# Patient Record
Sex: FEMALE | Race: OTHER | NOT HISPANIC OR LATINO
[De-identification: names, ages, dates, MRNs, and addresses within clinical notes are randomized per-mention and may not be internally consistent; named-entity substitution may affect disease eponyms.]

---

## 2018-01-09 PROBLEM — Z00.00 ENCOUNTER FOR PREVENTIVE HEALTH EXAMINATION: Status: ACTIVE | Noted: 2018-01-09

## 2018-01-31 ENCOUNTER — APPOINTMENT (OUTPATIENT)
Dept: PULMONOLOGY | Facility: CLINIC | Age: 83
End: 2018-01-31
Payer: MEDICARE

## 2018-01-31 VITALS
SYSTOLIC BLOOD PRESSURE: 142 MMHG | RESPIRATION RATE: 14 BRPM | HEIGHT: 58.5 IN | WEIGHT: 162 LBS | BODY MASS INDEX: 33.1 KG/M2 | DIASTOLIC BLOOD PRESSURE: 80 MMHG | OXYGEN SATURATION: 95 % | HEART RATE: 62 BPM

## 2018-01-31 DIAGNOSIS — Z86.79 PERSONAL HISTORY OF OTHER DISEASES OF THE CIRCULATORY SYSTEM: ICD-10-CM

## 2018-01-31 DIAGNOSIS — F41.9 ANXIETY DISORDER, UNSPECIFIED: ICD-10-CM

## 2018-01-31 DIAGNOSIS — R06.02 SHORTNESS OF BREATH: ICD-10-CM

## 2018-01-31 DIAGNOSIS — I27.20 PULMONARY HYPERTENSION, UNSPECIFIED: ICD-10-CM

## 2018-01-31 DIAGNOSIS — R93.8 ABNORMAL FINDINGS ON DIAGNOSTIC IMAGING OF OTHER SPECIFIED BODY STRUCTURES: ICD-10-CM

## 2018-01-31 PROCEDURE — 94727 GAS DIL/WSHOT DETER LNG VOL: CPT

## 2018-01-31 PROCEDURE — 94010 BREATHING CAPACITY TEST: CPT

## 2018-01-31 PROCEDURE — 99205 OFFICE O/P NEW HI 60 MIN: CPT | Mod: 25

## 2018-01-31 PROCEDURE — 94729 DIFFUSING CAPACITY: CPT

## 2018-01-31 PROCEDURE — 85018 HEMOGLOBIN: CPT | Mod: QW

## 2018-01-31 RX ORDER — FLUTICASONE PROPIONATE AND SALMETEROL 50; 100 UG/1; UG/1
100-50 POWDER RESPIRATORY (INHALATION)
Refills: 0 | Status: ACTIVE | COMMUNITY

## 2018-01-31 RX ORDER — LOVASTATIN 40 MG/1
40 TABLET ORAL
Refills: 0 | Status: ACTIVE | COMMUNITY

## 2018-01-31 RX ORDER — DIAZEPAM 2 MG/1
2 TABLET ORAL
Refills: 0 | Status: ACTIVE | COMMUNITY

## 2018-01-31 RX ORDER — ENALAPRIL MALEATE 5 MG/1
5 TABLET ORAL
Refills: 0 | Status: ACTIVE | COMMUNITY

## 2018-01-31 RX ORDER — ESCITALOPRAM OXALATE 20 MG/1
20 TABLET, FILM COATED ORAL
Refills: 0 | Status: ACTIVE | COMMUNITY

## 2018-01-31 RX ORDER — WARFARIN 2.5 MG/1
2.5 TABLET ORAL
Refills: 0 | Status: ACTIVE | COMMUNITY

## 2018-01-31 RX ORDER — METOPROLOL TARTRATE 50 MG/1
50 TABLET, FILM COATED ORAL
Refills: 0 | Status: ACTIVE | COMMUNITY

## 2018-01-31 RX ORDER — MONTELUKAST 10 MG/1
10 TABLET, FILM COATED ORAL
Refills: 0 | Status: ACTIVE | COMMUNITY

## 2018-01-31 RX ORDER — FUROSEMIDE 40 MG/1
40 TABLET ORAL
Refills: 0 | Status: ACTIVE | COMMUNITY

## 2018-01-31 RX ORDER — DIGOXIN 250 UG/1
250 TABLET ORAL
Refills: 0 | Status: ACTIVE | COMMUNITY

## 2022-04-22 ENCOUNTER — OUTPATIENT (OUTPATIENT)
Dept: OUTPATIENT SERVICES | Facility: HOSPITAL | Age: 87
LOS: 1 days | End: 2022-04-22

## 2022-04-22 ENCOUNTER — APPOINTMENT (OUTPATIENT)
Dept: NUCLEAR MEDICINE | Facility: CLINIC | Age: 87
End: 2022-04-22
Payer: MEDICARE

## 2022-04-22 DIAGNOSIS — R25.1 TREMOR, UNSPECIFIED: ICD-10-CM

## 2022-04-22 PROCEDURE — 78803 RP LOCLZJ TUM SPECT 1 AREA: CPT | Mod: 26

## 2022-12-21 ENCOUNTER — INPATIENT (INPATIENT)
Facility: HOSPITAL | Age: 87
LOS: 1 days | Discharge: HOME | DRG: 689 | End: 2022-12-23
Attending: INTERNAL MEDICINE | Admitting: STUDENT IN AN ORGANIZED HEALTH CARE EDUCATION/TRAINING PROGRAM
Payer: MEDICARE

## 2022-12-21 VITALS
RESPIRATION RATE: 16 BRPM | HEIGHT: 62 IN | TEMPERATURE: 98 F | DIASTOLIC BLOOD PRESSURE: 60 MMHG | WEIGHT: 145.06 LBS | HEART RATE: 89 BPM | SYSTOLIC BLOOD PRESSURE: 135 MMHG | OXYGEN SATURATION: 100 %

## 2022-12-21 DIAGNOSIS — E11.9 TYPE 2 DIABETES MELLITUS WITHOUT COMPLICATIONS: ICD-10-CM

## 2022-12-21 DIAGNOSIS — G20 PARKINSON'S DISEASE: ICD-10-CM

## 2022-12-21 DIAGNOSIS — N39.0 URINARY TRACT INFECTION, SITE NOT SPECIFIED: ICD-10-CM

## 2022-12-21 DIAGNOSIS — U07.1 COVID-19: ICD-10-CM

## 2022-12-21 DIAGNOSIS — Z02.9 ENCOUNTER FOR ADMINISTRATIVE EXAMINATIONS, UNSPECIFIED: ICD-10-CM

## 2022-12-21 DIAGNOSIS — G93.41 METABOLIC ENCEPHALOPATHY: ICD-10-CM

## 2022-12-21 DIAGNOSIS — I49.5 SICK SINUS SYNDROME: ICD-10-CM

## 2022-12-21 DIAGNOSIS — Z98.890 OTHER SPECIFIED POSTPROCEDURAL STATES: Chronic | ICD-10-CM

## 2022-12-21 DIAGNOSIS — Z90.49 ACQUIRED ABSENCE OF OTHER SPECIFIED PARTS OF DIGESTIVE TRACT: Chronic | ICD-10-CM

## 2022-12-21 DIAGNOSIS — I25.10 ATHEROSCLEROTIC HEART DISEASE OF NATIVE CORONARY ARTERY WITHOUT ANGINA PECTORIS: ICD-10-CM

## 2022-12-21 DIAGNOSIS — I50.30 UNSPECIFIED DIASTOLIC (CONGESTIVE) HEART FAILURE: ICD-10-CM

## 2022-12-21 DIAGNOSIS — I48.20 CHRONIC ATRIAL FIBRILLATION, UNSPECIFIED: ICD-10-CM

## 2022-12-21 LAB
ALBUMIN SERPL ELPH-MCNC: 3.8 G/DL — SIGNIFICANT CHANGE UP (ref 3.3–5.2)
ALP SERPL-CCNC: 110 U/L — SIGNIFICANT CHANGE UP (ref 40–120)
ALT FLD-CCNC: <5 U/L — SIGNIFICANT CHANGE UP
ANION GAP SERPL CALC-SCNC: 10 MMOL/L — SIGNIFICANT CHANGE UP (ref 5–17)
APPEARANCE UR: ABNORMAL
APTT BLD: 48.4 SEC — HIGH (ref 27.5–35.5)
AST SERPL-CCNC: 24 U/L — SIGNIFICANT CHANGE UP
BACTERIA # UR AUTO: ABNORMAL
BASOPHILS # BLD AUTO: 0.04 K/UL — SIGNIFICANT CHANGE UP (ref 0–0.2)
BASOPHILS NFR BLD AUTO: 0.5 % — SIGNIFICANT CHANGE UP (ref 0–2)
BILIRUB SERPL-MCNC: 0.7 MG/DL — SIGNIFICANT CHANGE UP (ref 0.4–2)
BILIRUB UR-MCNC: NEGATIVE — SIGNIFICANT CHANGE UP
BLD GP AB SCN SERPL QL: SIGNIFICANT CHANGE UP
BUN SERPL-MCNC: 16.3 MG/DL — SIGNIFICANT CHANGE UP (ref 8–20)
CALCIUM SERPL-MCNC: 9.3 MG/DL — SIGNIFICANT CHANGE UP (ref 8.4–10.5)
CHLORIDE SERPL-SCNC: 101 MMOL/L — SIGNIFICANT CHANGE UP (ref 96–108)
CO2 SERPL-SCNC: 29 MMOL/L — SIGNIFICANT CHANGE UP (ref 22–29)
COLOR SPEC: YELLOW — SIGNIFICANT CHANGE UP
COMMENT - URINE: SIGNIFICANT CHANGE UP
CREAT SERPL-MCNC: 0.93 MG/DL — SIGNIFICANT CHANGE UP (ref 0.5–1.3)
DIFF PNL FLD: ABNORMAL
DIGOXIN SERPL-MCNC: 0.8 NG/ML — SIGNIFICANT CHANGE UP (ref 0.8–2)
EGFR: 58 ML/MIN/1.73M2 — LOW
EOSINOPHIL # BLD AUTO: 0.12 K/UL — SIGNIFICANT CHANGE UP (ref 0–0.5)
EOSINOPHIL NFR BLD AUTO: 1.5 % — SIGNIFICANT CHANGE UP (ref 0–6)
EPI CELLS # UR: SIGNIFICANT CHANGE UP
FLUAV AG NPH QL: SIGNIFICANT CHANGE UP
FLUBV AG NPH QL: SIGNIFICANT CHANGE UP
GLUCOSE BLDC GLUCOMTR-MCNC: 89 MG/DL — SIGNIFICANT CHANGE UP (ref 70–99)
GLUCOSE SERPL-MCNC: 124 MG/DL — HIGH (ref 70–99)
GLUCOSE UR QL: NEGATIVE MG/DL — SIGNIFICANT CHANGE UP
GRAN CASTS # UR COMP ASSIST: ABNORMAL /LPF
HCT VFR BLD CALC: 35.5 % — SIGNIFICANT CHANGE UP (ref 34.5–45)
HGB BLD-MCNC: 11.5 G/DL — SIGNIFICANT CHANGE UP (ref 11.5–15.5)
HIV 1 & 2 AB SERPL IA.RAPID: SIGNIFICANT CHANGE UP
HYALINE CASTS # UR AUTO: ABNORMAL /LPF
IMM GRANULOCYTES NFR BLD AUTO: 0.2 % — SIGNIFICANT CHANGE UP (ref 0–0.9)
INR BLD: 3.24 RATIO — HIGH (ref 0.88–1.16)
KETONES UR-MCNC: NEGATIVE — SIGNIFICANT CHANGE UP
LEUKOCYTE ESTERASE UR-ACNC: ABNORMAL
LYMPHOCYTES # BLD AUTO: 1.63 K/UL — SIGNIFICANT CHANGE UP (ref 1–3.3)
LYMPHOCYTES # BLD AUTO: 20.1 % — SIGNIFICANT CHANGE UP (ref 13–44)
MAGNESIUM SERPL-MCNC: 1.9 MG/DL — SIGNIFICANT CHANGE UP (ref 1.8–2.6)
MCHC RBC-ENTMCNC: 29.5 PG — SIGNIFICANT CHANGE UP (ref 27–34)
MCHC RBC-ENTMCNC: 32.4 GM/DL — SIGNIFICANT CHANGE UP (ref 32–36)
MCV RBC AUTO: 91 FL — SIGNIFICANT CHANGE UP (ref 80–100)
MONOCYTES # BLD AUTO: 0.47 K/UL — SIGNIFICANT CHANGE UP (ref 0–0.9)
MONOCYTES NFR BLD AUTO: 5.8 % — SIGNIFICANT CHANGE UP (ref 2–14)
NEUTROPHILS # BLD AUTO: 5.82 K/UL — SIGNIFICANT CHANGE UP (ref 1.8–7.4)
NEUTROPHILS NFR BLD AUTO: 71.9 % — SIGNIFICANT CHANGE UP (ref 43–77)
NITRITE UR-MCNC: NEGATIVE — SIGNIFICANT CHANGE UP
PH UR: 8 — SIGNIFICANT CHANGE UP (ref 5–8)
PLATELET # BLD AUTO: 339 K/UL — SIGNIFICANT CHANGE UP (ref 150–400)
POTASSIUM SERPL-MCNC: 4 MMOL/L — SIGNIFICANT CHANGE UP (ref 3.5–5.3)
POTASSIUM SERPL-SCNC: 4 MMOL/L — SIGNIFICANT CHANGE UP (ref 3.5–5.3)
PROT SERPL-MCNC: 6.9 G/DL — SIGNIFICANT CHANGE UP (ref 6.6–8.7)
PROT UR-MCNC: 15
PROTHROM AB SERPL-ACNC: 38 SEC — HIGH (ref 10.5–13.4)
RBC # BLD: 3.9 M/UL — SIGNIFICANT CHANGE UP (ref 3.8–5.2)
RBC # FLD: 14.8 % — HIGH (ref 10.3–14.5)
RBC CASTS # UR COMP ASSIST: ABNORMAL /HPF (ref 0–4)
RSV RNA NPH QL NAA+NON-PROBE: SIGNIFICANT CHANGE UP
SARS-COV-2 RNA SPEC QL NAA+PROBE: DETECTED
SODIUM SERPL-SCNC: 139 MMOL/L — SIGNIFICANT CHANGE UP (ref 135–145)
SP GR SPEC: 1.01 — SIGNIFICANT CHANGE UP (ref 1.01–1.02)
TROPONIN T SERPL-MCNC: <0.01 NG/ML — SIGNIFICANT CHANGE UP (ref 0–0.06)
TSH SERPL-MCNC: 2.92 UIU/ML — SIGNIFICANT CHANGE UP (ref 0.27–4.2)
UROBILINOGEN FLD QL: NEGATIVE MG/DL — SIGNIFICANT CHANGE UP
WBC # BLD: 8.1 K/UL — SIGNIFICANT CHANGE UP (ref 3.8–10.5)
WBC # FLD AUTO: 8.1 K/UL — SIGNIFICANT CHANGE UP (ref 3.8–10.5)
WBC UR QL: ABNORMAL /HPF (ref 0–5)

## 2022-12-21 PROCEDURE — 99285 EMERGENCY DEPT VISIT HI MDM: CPT

## 2022-12-21 PROCEDURE — 99223 1ST HOSP IP/OBS HIGH 75: CPT

## 2022-12-21 PROCEDURE — 93010 ELECTROCARDIOGRAM REPORT: CPT

## 2022-12-21 PROCEDURE — 71045 X-RAY EXAM CHEST 1 VIEW: CPT | Mod: 26

## 2022-12-21 PROCEDURE — 70450 CT HEAD/BRAIN W/O DYE: CPT | Mod: 26,MA

## 2022-12-21 RX ORDER — SODIUM CHLORIDE 9 MG/ML
1000 INJECTION, SOLUTION INTRAVENOUS
Refills: 0 | Status: DISCONTINUED | OUTPATIENT
Start: 2022-12-21 | End: 2022-12-23

## 2022-12-21 RX ORDER — METOPROLOL TARTRATE 50 MG
25 TABLET ORAL DAILY
Refills: 0 | Status: DISCONTINUED | OUTPATIENT
Start: 2022-12-21 | End: 2022-12-23

## 2022-12-21 RX ORDER — CEFTRIAXONE 500 MG/1
1000 INJECTION, POWDER, FOR SOLUTION INTRAMUSCULAR; INTRAVENOUS ONCE
Refills: 0 | Status: DISCONTINUED | OUTPATIENT
Start: 2022-12-21 | End: 2022-12-21

## 2022-12-21 RX ORDER — DIGOXIN 250 MCG
125 TABLET ORAL DAILY
Refills: 0 | Status: DISCONTINUED | OUTPATIENT
Start: 2022-12-21 | End: 2022-12-23

## 2022-12-21 RX ORDER — METFORMIN HYDROCHLORIDE 850 MG/1
1 TABLET ORAL
Qty: 0 | Refills: 0 | DISCHARGE

## 2022-12-21 RX ORDER — REMDESIVIR 5 MG/ML
200 INJECTION INTRAVENOUS EVERY 24 HOURS
Refills: 0 | Status: COMPLETED | OUTPATIENT
Start: 2022-12-21 | End: 2022-12-21

## 2022-12-21 RX ORDER — DEXTROSE 50 % IN WATER 50 %
25 SYRINGE (ML) INTRAVENOUS ONCE
Refills: 0 | Status: DISCONTINUED | OUTPATIENT
Start: 2022-12-21 | End: 2022-12-23

## 2022-12-21 RX ORDER — INSULIN LISPRO 100/ML
VIAL (ML) SUBCUTANEOUS
Refills: 0 | Status: DISCONTINUED | OUTPATIENT
Start: 2022-12-21 | End: 2022-12-23

## 2022-12-21 RX ORDER — CLOPIDOGREL BISULFATE 75 MG/1
75 TABLET, FILM COATED ORAL DAILY
Refills: 0 | Status: DISCONTINUED | OUTPATIENT
Start: 2022-12-21 | End: 2022-12-23

## 2022-12-21 RX ORDER — BUPROPION HYDROCHLORIDE 150 MG/1
150 TABLET, EXTENDED RELEASE ORAL DAILY
Refills: 0 | Status: DISCONTINUED | OUTPATIENT
Start: 2022-12-21 | End: 2022-12-23

## 2022-12-21 RX ORDER — REMDESIVIR 5 MG/ML
100 INJECTION INTRAVENOUS EVERY 24 HOURS
Refills: 0 | Status: DISCONTINUED | OUTPATIENT
Start: 2022-12-22 | End: 2022-12-23

## 2022-12-21 RX ORDER — FUROSEMIDE 40 MG
1 TABLET ORAL
Qty: 0 | Refills: 0 | DISCHARGE

## 2022-12-21 RX ORDER — CLOPIDOGREL BISULFATE 75 MG/1
1 TABLET, FILM COATED ORAL
Qty: 0 | Refills: 0 | DISCHARGE

## 2022-12-21 RX ORDER — ATORVASTATIN CALCIUM 80 MG/1
1 TABLET, FILM COATED ORAL
Qty: 0 | Refills: 0 | DISCHARGE

## 2022-12-21 RX ORDER — CARBIDOPA AND LEVODOPA 25; 100 MG/1; MG/1
1 TABLET ORAL
Qty: 0 | Refills: 0 | DISCHARGE

## 2022-12-21 RX ORDER — CEFTRIAXONE 500 MG/1
1000 INJECTION, POWDER, FOR SOLUTION INTRAMUSCULAR; INTRAVENOUS EVERY 24 HOURS
Refills: 0 | Status: DISCONTINUED | OUTPATIENT
Start: 2022-12-21 | End: 2022-12-23

## 2022-12-21 RX ORDER — ESCITALOPRAM OXALATE 10 MG/1
20 TABLET, FILM COATED ORAL DAILY
Refills: 0 | Status: DISCONTINUED | OUTPATIENT
Start: 2022-12-21 | End: 2022-12-23

## 2022-12-21 RX ORDER — DEXTROSE 50 % IN WATER 50 %
15 SYRINGE (ML) INTRAVENOUS ONCE
Refills: 0 | Status: DISCONTINUED | OUTPATIENT
Start: 2022-12-21 | End: 2022-12-23

## 2022-12-21 RX ORDER — DIGOXIN 250 MCG
1 TABLET ORAL
Qty: 0 | Refills: 0 | DISCHARGE

## 2022-12-21 RX ORDER — ESCITALOPRAM OXALATE 10 MG/1
1 TABLET, FILM COATED ORAL
Qty: 0 | Refills: 0 | DISCHARGE

## 2022-12-21 RX ORDER — METOPROLOL TARTRATE 50 MG
1 TABLET ORAL
Qty: 0 | Refills: 0 | DISCHARGE

## 2022-12-21 RX ORDER — DEXTROSE 50 % IN WATER 50 %
12.5 SYRINGE (ML) INTRAVENOUS ONCE
Refills: 0 | Status: DISCONTINUED | OUTPATIENT
Start: 2022-12-21 | End: 2022-12-23

## 2022-12-21 RX ORDER — CARBIDOPA AND LEVODOPA 25; 100 MG/1; MG/1
1 TABLET ORAL THREE TIMES A DAY
Refills: 0 | Status: DISCONTINUED | OUTPATIENT
Start: 2022-12-21 | End: 2022-12-23

## 2022-12-21 RX ORDER — BUPROPION HYDROCHLORIDE 150 MG/1
1 TABLET, EXTENDED RELEASE ORAL
Qty: 0 | Refills: 0 | DISCHARGE

## 2022-12-21 RX ORDER — CEFTRIAXONE 500 MG/1
1000 INJECTION, POWDER, FOR SOLUTION INTRAMUSCULAR; INTRAVENOUS ONCE
Refills: 0 | Status: COMPLETED | OUTPATIENT
Start: 2022-12-21 | End: 2022-12-21

## 2022-12-21 RX ORDER — REMDESIVIR 5 MG/ML
INJECTION INTRAVENOUS
Refills: 0 | Status: DISCONTINUED | OUTPATIENT
Start: 2022-12-21 | End: 2022-12-23

## 2022-12-21 RX ORDER — GLUCAGON INJECTION, SOLUTION 0.5 MG/.1ML
1 INJECTION, SOLUTION SUBCUTANEOUS ONCE
Refills: 0 | Status: DISCONTINUED | OUTPATIENT
Start: 2022-12-21 | End: 2022-12-23

## 2022-12-21 RX ORDER — ATORVASTATIN CALCIUM 80 MG/1
40 TABLET, FILM COATED ORAL AT BEDTIME
Refills: 0 | Status: DISCONTINUED | OUTPATIENT
Start: 2022-12-21 | End: 2022-12-23

## 2022-12-21 RX ADMIN — CEFTRIAXONE 1000 MILLIGRAM(S): 500 INJECTION, POWDER, FOR SOLUTION INTRAMUSCULAR; INTRAVENOUS at 14:19

## 2022-12-21 RX ADMIN — ATORVASTATIN CALCIUM 40 MILLIGRAM(S): 80 TABLET, FILM COATED ORAL at 22:40

## 2022-12-21 RX ADMIN — CARBIDOPA AND LEVODOPA 1 TABLET(S): 25; 100 TABLET ORAL at 22:40

## 2022-12-21 RX ADMIN — REMDESIVIR 200 MILLIGRAM(S): 5 INJECTION INTRAVENOUS at 20:25

## 2022-12-21 NOTE — PATIENT PROFILE ADULT - FALL HARM RISK - HARM RISK INTERVENTIONS
Assistance with ambulation/Assistance OOB with selected safe patient handling equipment/Communicate Risk of Fall with Harm to all staff/Discuss with provider need for PT consult/Monitor for mental status changes/Monitor gait and stability/Move patient closer to nurses' station/Provide patient with walking aids - walker, cane, crutches/Reinforce activity limits and safety measures with patient and family/Reorient to person, place and time as needed/Tailored Fall Risk Interventions/Toileting schedule using arm’s reach rule for commode and bathroom/Use of alarms - bed, chair and/or voice tab/Visual Cue: Yellow wristband and red socks/Bed in lowest position, wheels locked, appropriate side rails in place/Call bell, personal items and telephone in reach/Instruct patient to call for assistance before getting out of bed or chair/Non-slip footwear when patient is out of bed/[place] to call system/Physically safe environment - no spills, clutter or unnecessary equipment/Purposeful Proactive Rounding/Room/bathroom lighting operational, light cord in reach

## 2022-12-21 NOTE — ED ADULT NURSE REASSESSMENT NOTE - NS ED NURSE REASSESS COMMENT FT1
assumed care from Renae WAKEFIELD. pt is a 89 y/o/f a&ox1, w/CHF, HTN, DM, parkinsons and dementia who presented with son c/o 2 days of intermittent dizziness, and weakness when trying to walk. pt lives at home with son and has aid from 9-5 daily. pt denies CP, SOB, fevers, chills, abd pain, N/V/D. pt was dx COVID+ 1 week ago but presently only has a cough. pt is admitted to the hospital, son remains at bedside.

## 2022-12-21 NOTE — ED PROVIDER NOTE - CARE PLAN
1 Principal Discharge DX:	Acute UTI  Secondary Diagnosis:	Delirium   Principal Discharge DX:	Acute UTI  Secondary Diagnosis:	Delirium  Secondary Diagnosis:	Atrial fibrillation

## 2022-12-21 NOTE — ED PROVIDER NOTE - PHYSICAL EXAMINATION
Gen: NAD, AOx1-2 oriented to self and location  Head: NCAT  HEENT: oral mucosa moist, normal conjunctiva, neck supple  Lung: CTAB, no respiratory distress  CV: rrr, no murmur, Normal perfusion  Abd: soft, NTND  MSK: No edema, no visible deformities  Neuro: no facial asymmetry, no dysarthria, 5/5 UE strength and LE strength b/l, unable to assess ataxia due to comprehension  Skin: No rash   Psych: normal affect

## 2022-12-21 NOTE — ED PROVIDER NOTE - CLINICAL SUMMARY MEDICAL DECISION MAKING FREE TEXT BOX
patient in no acute distress, describing intermittent positional dizziness. no neuro deficits on exam. priority CT head on warfarin. labs. ekg. noted in A fib, unsure if new. interrogate pacemaker. reasses

## 2022-12-21 NOTE — ED ADULT TRIAGE NOTE - CHIEF COMPLAINT QUOTE
pt BIBA from home @ baseline mental status, reports dizziness when standing up x 2 days. pt denies chest pain, sob, n/v, falls, fevers. pt hx dementia. dr judge @ bedside, pt taken to ct

## 2022-12-21 NOTE — ED PROVIDER NOTE - PROGRESS NOTE DETAILS
Sons arrived, state patient has Parkinsons but over last 2 days much more confused. Did not recognize son today. had COVID last week- doing better. had spoken with physician who stated could be long covid/post covid. Does have foul smelling urine and has had UTI in past. discused advanced directives- none in place but will discuss with family. -Joshua DO patient noted in A fib, unclear if new onset is on warfarin. will page cardiology for inpatient christine Diaz DO

## 2022-12-21 NOTE — H&P ADULT - NSHPLABSRESULTS_GEN_ALL_CORE
LABS:                         11.5   8.10  )-----------( 339      ( 21 Dec 2022 10:30 )             35.5         139  |  101  |  16.3  ----------------------------<  124<H>  4.0   |  29.0  |  0.93    Ca    9.3      21 Dec 2022 10:30  Mg     1.9         TPro  6.9  /  Alb  3.8  /  TBili  0.7  /  DBili  x   /  AST  24  /  ALT  <5  /  AlkPhos  110  12    PT/INR - ( 21 Dec 2022 10:30 )   PT: 38.0 sec;   INR: 3.24 ratio         PTT - ( 21 Dec 2022 10:30 )  PTT:48.4 sec  Urinalysis Basic - ( 21 Dec 2022 12:00 )    Color: Yellow / Appearance: Slightly Turbid / S.015 / pH: x  Gluc: x / Ketone: Negative  / Bili: Negative / Urobili: Negative mg/dL   Blood: x / Protein: 15 / Nitrite: Negative   Leuk Esterase: Moderate / RBC: 3-5 /HPF / WBC 11-25 /HPF   Sq Epi: x / Non Sq Epi: Few / Bacteria: Moderate      CARDIAC MARKERS ( 21 Dec 2022 10:30 )  x     / <0.01 ng/mL / x     / x     / x          Serum Pro-Brain Natriuretic Peptide: 491 pg/mL ( @ 10:30)      Records reviewed from prior hospitalization.  Labs reviewed remarkable for   EKG personally reviewed   CXR personally reviewed

## 2022-12-21 NOTE — H&P ADULT - NSICDXPASTMEDICALHX_GEN_ALL_CORE_FT
PAST MEDICAL HISTORY:  CAD (coronary artery disease)     Chronic atrial fibrillation     Left heart failure with preserved LV function     Parkinsons disease     Tachy-neena syndrome

## 2022-12-21 NOTE — CONSULT NOTE ADULT - SUBJECTIVE AND OBJECTIVE BOX
[facility]                                   [address] [place] [address]                                                      PHONE: [phone]                                                         FAX: [phone]  [email or web address]  ---------------------------------------------------------------------------------------------------------------------------------    Reason for Consult: Dizziness    HPI:  89 year old female with a PMHx of HFpEF, persistent afib on coumadin, bioMVR, moderate to severe TR, tachy-neena syndrome s/p PPM, CAD s/p PCI to RCA, recurrent falls and syncope, parkinson's disease, dementia who presents with dizziness and being off balance. Patient has home health aides. Patient has been instructed to not get up by herself however as soon as her aides leave, she gets up. She recently had a fall where she hit her head about two months ago and was at [facility]. Patient also recently had a COVID infection. Over the last two days the son noticed that the patient has been off balance more than usual when she gets up, however she has not fallen in the past two days. She was brought to the ER for further management. Patient denies any symptoms at this time and states that she has no chest pain, SOB, palpitations, near syncope, or syncope.       PAST MEDICAL & SURGICAL HISTORY:      No Known Allergies      MEDICATIONS  (STANDING):    MEDICATIONS  (PRN):      Social History:  Cigarettes:  Denies current tobacco use                  Alcohol:  Denies daily etoh            Illicit Drug Abuse:  Denies    Family History:  denies CAD, MI, CVA, or sudden death.    ROS: Negative other than as mentioned in HPI.    Vital Signs Last 24 Hrs  T(C): 37.1 (21 Dec 2022 11:35), Max: 37.1 (21 Dec 2022 11:35)  T(F): 98.7 (21 Dec 2022 11:35), Max: 98.7 (21 Dec 2022 11:35)  HR: 78 (21 Dec 2022 11:35) (78 - 89)  BP: 147/65 (21 Dec 2022 11:35) (135/60 - 147/65)  BP(mean): --  RR: 18 (21 Dec 2022 11:35) (16 - 18)  SpO2: 95% (21 Dec 2022 11:35) (95% - 100%)    Parameters below as of 21 Dec 2022 11:35  Patient On (Oxygen Delivery Method): room air      ICU Vital Signs Last 24 Hrs  JARED STUART  I&O's Detail    I&O's Summary    Drug Dosing Weight  JARED STUART      PHYSICAL EXAM:  General: NAD  HEENT: Head; normocephalic, atraumatic.  Eyes: Conjunctiva normal  Neck: Supple  CARDIOVASCULAR: Irregularly irregular  LUNGS: No respiratory distress, normal inspiratory effort  ABDOMEN: Soft  EXTREMITIES: No edema b/l   SKIN: warm  NEURO: Alert        LABS:                        11.5   8.10  )-----------( 339      ( 21 Dec 2022 10:30 )             35.5     12-    139  |  101  |  16.3  ----------------------------<  124<H>  4.0   |  29.0  |  0.93    Ca    9.3      21 Dec 2022 10:30  Mg     1.9     12-    TPro  6.9  /  Alb  3.8  /  TBili  0.7  /  DBili  x   /  AST  24  /  ALT  <5  /  AlkPhos  110  12-    [facility]  CARDIAC MARKERS ( 21 Dec 2022 10:30 )  x     / <0.01 ng/mL / x     / x     / x          PT/INR - ( 21 Dec 2022 10:30 )   PT: 38.0 sec;   INR: 3.24 ratio         PTT - ( 21 Dec 2022 10:30 )  PTT:48.4 sec  Urinalysis Basic - ( 21 Dec 2022 12:00 )    Color: Yellow / Appearance: Slightly Turbid / S.015 / pH: x  Gluc: x / Ketone: Negative  / Bili: Negative / Urobili: Negative mg/dL   Blood: x / Protein: 15 / Nitrite: Negative   Leuk Esterase: Moderate / RBC: 3-5 /HPF / WBC 11-25 /HPF   Sq Epi: x / Non Sq Epi: Few / Bacteria: Moderate        RADIOLOGY & ADDITIONAL STUDIES:    INTERPRETATION OF TELEMETRY (personally reviewed): Not on tele.    ECG: Afib, LAD, non specific T wave changes     ECHO: Pending    CXR: sternal wires present, no significant pleural effusions     Assessment and Plan:  89 year old female with a PMHx of HFpEF, persistent afib on coumadin, bioMVR, moderate to severe TR, tachy-neena syndrome s/p PPM, CAD s/p PCI to RCA, recurrent falls and syncope, parkinson's disease, dementia who presents with dizziness and being off balance.    Dizziness  - obtain an echo  - monitor on tele  - will have pacemaker interrogated  - patient has UTI and possible acute sinusitis with recent COVID infection, and this likely is a factor in patient's off-balance  - IV hydration as patient appears hypovolemic    Afib  - patient currently on coumadin with supratherapeutic INR  - as the patient has had multiple falls in the past with a fall two months ago where she hit her head, would hold patient's coumadin as she is a high fall risk  - will evaluate for watchman as an outpatient  - currently rate controlled  - c/w home toprol  - c/w home digoxin    CAD s/p PCI  - c/w home plavix  - c/w home atorvastatin    Thank you for letting [facility] to assist in the management of this patient. Please call with any questions.

## 2022-12-21 NOTE — H&P ADULT - PROBLEM SELECTOR PLAN 3
Currently not hypoxic.   Due to high rate of progression along with comorbidities will start patient on remdesivir

## 2022-12-21 NOTE — PATIENT PROFILE ADULT - STATED REASON FOR ADMISSION
unable to state    she tested positive for covid last friday, she was okay throughout weekend. She seemed tired and trouble walking  and primary doctor told son to bring her in hospital.

## 2022-12-21 NOTE — ED PROVIDER NOTE - OBJECTIVE STATEMENT
89yo F with CHF, HTN, DM, dementia,  HLD, depression, valve replacement, A fib?, on warfarin. presenting with 2 days of intermittent dizziness, not present at rest/sitting, unable to walk due to dizziness. no CP/SOB. no abd pain. no vomiting/diarrhea. no bleeding. no focal weakness. says 'legs are weak' to son per EMS. via  patient states dizzy but with dementia/hard of hearing difficult obtaining more detailed history

## 2022-12-21 NOTE — H&P ADULT - HISTORY OF PRESENT ILLNESS
89 yr old F w/a PMH of DCHF, HTN, Parkinsons disease w/dementia, afib on coumadin, TAVR, MVR and depression presents for 2 days of being off.  As per son at bedside, he tested positive for COVID last week and tested his mom a couple of days ago.  She was minimally symptomatic from COVID he reports no cough.  He states for 2 days she has been off, more confused than usual, just not herself.  Does not report any fevers at home.  She has a aid daily from 9-5.

## 2022-12-21 NOTE — H&P ADULT - PROBLEM SELECTOR PLAN 9
On metformin at home.  Will check hgba1c if under 7 would not discharge on Metformin given age and adverse risk  Sliding scale

## 2022-12-21 NOTE — H&P ADULT - ASSESSMENT
89 year old female with a PMHx of HFpEF, persistent afib on coumadin, bioMVR, moderate to severe TR, tachy-neena syndrome s/p PPM, CAD s/p PCI to RCA, recurrent falls and syncope, parkinson's disease, dementia presents for altered mental status found to have UTI and COVID +

## 2022-12-21 NOTE — H&P ADULT - PROBLEM SELECTOR PLAN 1
Likely due to the ongoing infectious etiologies   CT Head no acute intracranial pathology.  Monitor Clinically

## 2022-12-21 NOTE — H&P ADULT - PROBLEM SELECTOR PLAN 4
History of atrial fibrillation.    After discussion with family and cardiology patient has recurrent falls.  Patient bleeding risk is higher than risk of stroke at this point  Will hold coumadin for now, with plan for outpatient watchman   -Continue Digoxin and Metoprolol XL 25

## 2022-12-22 ENCOUNTER — TRANSCRIPTION ENCOUNTER (OUTPATIENT)
Age: 87
End: 2022-12-22

## 2022-12-22 LAB
A1C WITH ESTIMATED AVERAGE GLUCOSE RESULT: 5.1 % — SIGNIFICANT CHANGE UP (ref 4–5.6)
ALBUMIN SERPL ELPH-MCNC: 3.7 G/DL — SIGNIFICANT CHANGE UP (ref 3.3–5.2)
ALBUMIN SERPL ELPH-MCNC: 3.7 G/DL — SIGNIFICANT CHANGE UP (ref 3.3–5.2)
ALP SERPL-CCNC: 100 U/L — SIGNIFICANT CHANGE UP (ref 40–120)
ALP SERPL-CCNC: 102 U/L — SIGNIFICANT CHANGE UP (ref 40–120)
ALT FLD-CCNC: <5 U/L — SIGNIFICANT CHANGE UP
ALT FLD-CCNC: <5 U/L — SIGNIFICANT CHANGE UP
APTT BLD: 48 SEC — HIGH (ref 27.5–35.5)
AST SERPL-CCNC: 23 U/L — SIGNIFICANT CHANGE UP
AST SERPL-CCNC: 23 U/L — SIGNIFICANT CHANGE UP
BILIRUB DIRECT SERPL-MCNC: 0.1 MG/DL — SIGNIFICANT CHANGE UP (ref 0–0.3)
BILIRUB INDIRECT FLD-MCNC: 0.4 MG/DL — SIGNIFICANT CHANGE UP (ref 0.2–1)
BILIRUB SERPL-MCNC: 0.6 MG/DL — SIGNIFICANT CHANGE UP (ref 0.4–2)
BILIRUB SERPL-MCNC: 0.6 MG/DL — SIGNIFICANT CHANGE UP (ref 0.4–2)
BUN SERPL-MCNC: 16.8 MG/DL — SIGNIFICANT CHANGE UP (ref 8–20)
CALCIUM SERPL-MCNC: 9.1 MG/DL — SIGNIFICANT CHANGE UP (ref 8.4–10.5)
CHLORIDE SERPL-SCNC: 102 MMOL/L — SIGNIFICANT CHANGE UP (ref 96–108)
CO2 SERPL-SCNC: 29 MMOL/L — SIGNIFICANT CHANGE UP (ref 22–29)
CREAT SERPL-MCNC: 0.85 MG/DL — SIGNIFICANT CHANGE UP (ref 0.5–1.3)
CREAT SERPL-MCNC: 0.88 MG/DL — SIGNIFICANT CHANGE UP (ref 0.5–1.3)
CRP SERPL-MCNC: <4 MG/L — SIGNIFICANT CHANGE UP
D DIMER BLD IA.RAPID-MCNC: <150 NG/ML DDU — SIGNIFICANT CHANGE UP
EGFR: 62 ML/MIN/1.73M2 — SIGNIFICANT CHANGE UP
EGFR: 65 ML/MIN/1.73M2 — SIGNIFICANT CHANGE UP
ESTIMATED AVERAGE GLUCOSE: 100 MG/DL — SIGNIFICANT CHANGE UP (ref 68–114)
GLUCOSE BLDC GLUCOMTR-MCNC: 107 MG/DL — HIGH (ref 70–99)
GLUCOSE BLDC GLUCOMTR-MCNC: 140 MG/DL — HIGH (ref 70–99)
GLUCOSE BLDC GLUCOMTR-MCNC: 97 MG/DL — SIGNIFICANT CHANGE UP (ref 70–99)
GLUCOSE BLDC GLUCOMTR-MCNC: 98 MG/DL — SIGNIFICANT CHANGE UP (ref 70–99)
GLUCOSE SERPL-MCNC: 104 MG/DL — HIGH (ref 70–99)
HCT VFR BLD CALC: 34.9 % — SIGNIFICANT CHANGE UP (ref 34.5–45)
HGB BLD-MCNC: 11.3 G/DL — LOW (ref 11.5–15.5)
INR BLD: 2.91 RATIO — HIGH (ref 0.88–1.16)
LDH SERPL L TO P-CCNC: 258 U/L — HIGH (ref 98–192)
MCHC RBC-ENTMCNC: 29.6 PG — SIGNIFICANT CHANGE UP (ref 27–34)
MCHC RBC-ENTMCNC: 32.4 GM/DL — SIGNIFICANT CHANGE UP (ref 32–36)
MCV RBC AUTO: 91.4 FL — SIGNIFICANT CHANGE UP (ref 80–100)
PLATELET # BLD AUTO: 334 K/UL — SIGNIFICANT CHANGE UP (ref 150–400)
POTASSIUM SERPL-MCNC: 3.9 MMOL/L — SIGNIFICANT CHANGE UP (ref 3.5–5.3)
POTASSIUM SERPL-SCNC: 3.9 MMOL/L — SIGNIFICANT CHANGE UP (ref 3.5–5.3)
PROT SERPL-MCNC: 6.6 G/DL — SIGNIFICANT CHANGE UP (ref 6.6–8.7)
PROT SERPL-MCNC: 6.8 G/DL — SIGNIFICANT CHANGE UP (ref 6.6–8.7)
PROTHROM AB SERPL-ACNC: 34.1 SEC — HIGH (ref 10.5–13.4)
RBC # BLD: 3.82 M/UL — SIGNIFICANT CHANGE UP (ref 3.8–5.2)
RBC # FLD: 14.7 % — HIGH (ref 10.3–14.5)
SODIUM SERPL-SCNC: 139 MMOL/L — SIGNIFICANT CHANGE UP (ref 135–145)
WBC # BLD: 8.1 K/UL — SIGNIFICANT CHANGE UP (ref 3.8–10.5)
WBC # FLD AUTO: 8.1 K/UL — SIGNIFICANT CHANGE UP (ref 3.8–10.5)

## 2022-12-22 PROCEDURE — 99233 SBSQ HOSP IP/OBS HIGH 50: CPT

## 2022-12-22 PROCEDURE — 93306 TTE W/DOPPLER COMPLETE: CPT | Mod: 26

## 2022-12-22 RX ADMIN — CEFTRIAXONE 1000 MILLIGRAM(S): 500 INJECTION, POWDER, FOR SOLUTION INTRAMUSCULAR; INTRAVENOUS at 14:05

## 2022-12-22 RX ADMIN — ESCITALOPRAM OXALATE 20 MILLIGRAM(S): 10 TABLET, FILM COATED ORAL at 14:04

## 2022-12-22 RX ADMIN — CARBIDOPA AND LEVODOPA 1 TABLET(S): 25; 100 TABLET ORAL at 05:26

## 2022-12-22 RX ADMIN — Medication 25 MILLIGRAM(S): at 05:26

## 2022-12-22 RX ADMIN — CARBIDOPA AND LEVODOPA 1 TABLET(S): 25; 100 TABLET ORAL at 14:04

## 2022-12-22 RX ADMIN — CARBIDOPA AND LEVODOPA 1 TABLET(S): 25; 100 TABLET ORAL at 21:43

## 2022-12-22 RX ADMIN — Medication 125 MICROGRAM(S): at 05:27

## 2022-12-22 RX ADMIN — REMDESIVIR 200 MILLIGRAM(S): 5 INJECTION INTRAVENOUS at 21:30

## 2022-12-22 RX ADMIN — CLOPIDOGREL BISULFATE 75 MILLIGRAM(S): 75 TABLET, FILM COATED ORAL at 14:03

## 2022-12-22 RX ADMIN — ATORVASTATIN CALCIUM 40 MILLIGRAM(S): 80 TABLET, FILM COATED ORAL at 21:31

## 2022-12-22 RX ADMIN — BUPROPION HYDROCHLORIDE 150 MILLIGRAM(S): 150 TABLET, EXTENDED RELEASE ORAL at 14:07

## 2022-12-22 NOTE — PROGRESS NOTE ADULT - ASSESSMENT
The patient is a 89 year old female with a past medical history of HFpEF, persistent afib on coumadin, bioMVR,  moderate to severe TR, tachy-neena syndrome s/p PPM, CAD s/p PCI to RCA, recurrent falls and syncope, parkinson's disease, dementia presents for altered mental status found to have UTI and COVID +. In the ER , CT head was negative. COVID positive with negative chest xray. Started on empiric IV Rocephin.     Assessment/Plan:    1. Acute metabolic encephalopathy secondary to UTI  - CT head negative  - Positive UA- On Empiric IV Rocephin pending urine and blood culture results    2. COVID 19  - Not hypoxic  - Started on IV Remdesivir day 2- Due to high risk of progression with underlying comorbidities    3. Chronic Afib  - On home coumadin- Supra- therapeutic INR  - Seen by Cardiology, after discussion with the family deemed high risk for AC given recurrent falls and bleeding risk  - To continue digoxin ( level WNL) and Metoprolol PO  - To be evaluated as outpatient for Watchman's procedure    4. Chronic HFpEF  - Lasix held for hypovolemia  - On metoprolol  - Follow up Echocardiogram    5.  History of tachy- neena syndrome  - Pacemaker in place  - Interrogation by Cardiology    6. Parkinson's disease  - Sinemet  - PT evaluation    7. History of CAD  - BB, Plavix, Statin     8. Diabetes Mellitus Type 2  - Admelog sliding scale  - Monitor BSl    VTE- SCDS; supra therapeutic INR     Discharge disposition: PT evaluation  Home aids 8 hours a day     The patient is a 89 year old female with a past medical history of HFpEF, persistent afib on coumadin, bioMVR,  moderate to severe TR, tachy-neena syndrome s/p PPM, CAD s/p PCI to RCA, recurrent falls and syncope, parkinson's disease, dementia presents for altered mental status found to have UTI and COVID +. In the ER , CT head was negative. COVID positive with negative chest xray. Started on empiric IV Rocephin.     Assessment/Plan:    1. Acute metabolic encephalopathy secondary to UTI  - CT head negative  - Positive UA- On Empiric IV Rocephin pending urine and blood culture results    2. COVID 19  - Not hypoxic  - Started on IV Remdesivir day 2- Due to high risk of progression with underlying comorbidities    3. Chronic Afib  - On home coumadin- Supra- therapeutic INR  - Seen by Cardiology, after discussion with the family deemed high risk for AC given recurrent falls and bleeding risk  - To continue digoxin ( level WNL) and Metoprolol PO  - To be evaluated as outpatient for Watchman's procedure    4. Chronic HFpEF  - Lasix held for hypovolemia  - On metoprolol  - Follow up Echocardiogram    5.  History of tachy- neena syndrome  - Pacemaker in place  - Interrogation by Cardiology    6. Parkinson's disease  - Sinemet  - PT evaluation    7. History of CAD  - BB, Plavix, Statin     8. Diabetes Mellitus Type 2  - Admelog sliding scale  - Monitor BSl    VTE- SCDS; supra therapeutic INR     Discharge disposition: PT following     Home aids 8 hours a day but patient is alone 2-3 hours a day and is unsteady on her feet with frequent falls. Home with aids and family assist vs BELLE pending progress

## 2022-12-22 NOTE — DISCHARGE NOTE NURSING/CASE MANAGEMENT/SOCIAL WORK - PATIENT PORTAL LINK FT
You can access the FollowMyHealth Patient Portal offered by [facility] by registering at the following website: [email or web address]. By joining [facility]’s FollowMyHealth portal, you will also be able to view your health information using other applications (apps) compatible with our system.

## 2022-12-22 NOTE — PHYSICAL THERAPY INITIAL EVALUATION ADULT - ADDITIONAL COMMENTS
Pt confused, unable to provide details of previous living environment - reports she lives in [place] and has sons but cannot say if family lives with or near her. Per [facility] pt has HHA.

## 2022-12-22 NOTE — DISCHARGE NOTE NURSING/CASE MANAGEMENT/SOCIAL WORK - NSDCPEFALRISK_GEN_ALL_CORE
For information on Fall & Injury Prevention, visit: [email or web address] OR  [email or web address] OR  https://www.cdc.gov/steadi/patient.html

## 2022-12-23 ENCOUNTER — TRANSCRIPTION ENCOUNTER (OUTPATIENT)
Age: 87
End: 2022-12-23

## 2022-12-23 VITALS
DIASTOLIC BLOOD PRESSURE: 67 MMHG | HEART RATE: 84 BPM | TEMPERATURE: 99 F | RESPIRATION RATE: 18 BRPM | SYSTOLIC BLOOD PRESSURE: 140 MMHG | OXYGEN SATURATION: 95 %

## 2022-12-23 LAB
ALBUMIN SERPL ELPH-MCNC: 3.6 G/DL — SIGNIFICANT CHANGE UP (ref 3.3–5.2)
ALBUMIN SERPL ELPH-MCNC: 3.7 G/DL — SIGNIFICANT CHANGE UP (ref 3.3–5.2)
ALP SERPL-CCNC: 103 U/L — SIGNIFICANT CHANGE UP (ref 40–120)
ALP SERPL-CCNC: 105 U/L — SIGNIFICANT CHANGE UP (ref 40–120)
ALT FLD-CCNC: <5 U/L — SIGNIFICANT CHANGE UP
ALT FLD-CCNC: <5 U/L — SIGNIFICANT CHANGE UP
ANION GAP SERPL CALC-SCNC: 11 MMOL/L — SIGNIFICANT CHANGE UP (ref 5–17)
AST SERPL-CCNC: 24 U/L — SIGNIFICANT CHANGE UP
AST SERPL-CCNC: 25 U/L — SIGNIFICANT CHANGE UP
BILIRUB DIRECT SERPL-MCNC: 0.2 MG/DL — SIGNIFICANT CHANGE UP (ref 0–0.3)
BILIRUB INDIRECT FLD-MCNC: 0.4 MG/DL — SIGNIFICANT CHANGE UP (ref 0.2–1)
BILIRUB SERPL-MCNC: 0.6 MG/DL — SIGNIFICANT CHANGE UP (ref 0.4–2)
BILIRUB SERPL-MCNC: 0.6 MG/DL — SIGNIFICANT CHANGE UP (ref 0.4–2)
BUN SERPL-MCNC: 15.4 MG/DL — SIGNIFICANT CHANGE UP (ref 8–20)
CALCIUM SERPL-MCNC: 9.1 MG/DL — SIGNIFICANT CHANGE UP (ref 8.4–10.5)
CHLORIDE SERPL-SCNC: 102 MMOL/L — SIGNIFICANT CHANGE UP (ref 96–108)
CO2 SERPL-SCNC: 27 MMOL/L — SIGNIFICANT CHANGE UP (ref 22–29)
CREAT SERPL-MCNC: 0.86 MG/DL — SIGNIFICANT CHANGE UP (ref 0.5–1.3)
CREAT SERPL-MCNC: 0.86 MG/DL — SIGNIFICANT CHANGE UP (ref 0.5–1.3)
EGFR: 64 ML/MIN/1.73M2 — SIGNIFICANT CHANGE UP
EGFR: 64 ML/MIN/1.73M2 — SIGNIFICANT CHANGE UP
GLUCOSE BLDC GLUCOMTR-MCNC: 108 MG/DL — HIGH (ref 70–99)
GLUCOSE BLDC GLUCOMTR-MCNC: 109 MG/DL — HIGH (ref 70–99)
GLUCOSE BLDC GLUCOMTR-MCNC: 110 MG/DL — HIGH (ref 70–99)
GLUCOSE SERPL-MCNC: 110 MG/DL — HIGH (ref 70–99)
HCT VFR BLD CALC: 37.9 % — SIGNIFICANT CHANGE UP (ref 34.5–45)
HGB BLD-MCNC: 11.9 G/DL — SIGNIFICANT CHANGE UP (ref 11.5–15.5)
INR BLD: 2.16 RATIO — HIGH (ref 0.88–1.16)
MCHC RBC-ENTMCNC: 29.1 PG — SIGNIFICANT CHANGE UP (ref 27–34)
MCHC RBC-ENTMCNC: 31.4 GM/DL — LOW (ref 32–36)
MCV RBC AUTO: 92.7 FL — SIGNIFICANT CHANGE UP (ref 80–100)
PLATELET # BLD AUTO: 353 K/UL — SIGNIFICANT CHANGE UP (ref 150–400)
POTASSIUM SERPL-MCNC: 3.9 MMOL/L — SIGNIFICANT CHANGE UP (ref 3.5–5.3)
POTASSIUM SERPL-SCNC: 3.9 MMOL/L — SIGNIFICANT CHANGE UP (ref 3.5–5.3)
PROT SERPL-MCNC: 6.8 G/DL — SIGNIFICANT CHANGE UP (ref 6.6–8.7)
PROT SERPL-MCNC: 6.8 G/DL — SIGNIFICANT CHANGE UP (ref 6.6–8.7)
PROTHROM AB SERPL-ACNC: 25.2 SEC — HIGH (ref 10.5–13.4)
RBC # BLD: 4.09 M/UL — SIGNIFICANT CHANGE UP (ref 3.8–5.2)
RBC # FLD: 14.8 % — HIGH (ref 10.3–14.5)
SODIUM SERPL-SCNC: 140 MMOL/L — SIGNIFICANT CHANGE UP (ref 135–145)
WBC # BLD: 10 K/UL — SIGNIFICANT CHANGE UP (ref 3.8–10.5)
WBC # FLD AUTO: 10 K/UL — SIGNIFICANT CHANGE UP (ref 3.8–10.5)

## 2022-12-23 PROCEDURE — 86901 BLOOD TYPING SEROLOGIC RH(D): CPT

## 2022-12-23 PROCEDURE — 87086 URINE CULTURE/COLONY COUNT: CPT

## 2022-12-23 PROCEDURE — 84443 ASSAY THYROID STIM HORMONE: CPT

## 2022-12-23 PROCEDURE — C8929: CPT

## 2022-12-23 PROCEDURE — 86703 HIV-1/HIV-2 1 RESULT ANTBDY: CPT

## 2022-12-23 PROCEDURE — 71045 X-RAY EXAM CHEST 1 VIEW: CPT

## 2022-12-23 PROCEDURE — 87637 SARSCOV2&INF A&B&RSV AMP PRB: CPT

## 2022-12-23 PROCEDURE — 85027 COMPLETE CBC AUTOMATED: CPT

## 2022-12-23 PROCEDURE — 97530 THERAPEUTIC ACTIVITIES: CPT

## 2022-12-23 PROCEDURE — 70450 CT HEAD/BRAIN W/O DYE: CPT | Mod: MA

## 2022-12-23 PROCEDURE — 86140 C-REACTIVE PROTEIN: CPT

## 2022-12-23 PROCEDURE — 85379 FIBRIN DEGRADATION QUANT: CPT

## 2022-12-23 PROCEDURE — 93005 ELECTROCARDIOGRAM TRACING: CPT

## 2022-12-23 PROCEDURE — 83036 HEMOGLOBIN GLYCOSYLATED A1C: CPT

## 2022-12-23 PROCEDURE — 80053 COMPREHEN METABOLIC PANEL: CPT

## 2022-12-23 PROCEDURE — 85025 COMPLETE CBC W/AUTO DIFF WBC: CPT

## 2022-12-23 PROCEDURE — 82962 GLUCOSE BLOOD TEST: CPT

## 2022-12-23 PROCEDURE — 83880 ASSAY OF NATRIURETIC PEPTIDE: CPT

## 2022-12-23 PROCEDURE — 86850 RBC ANTIBODY SCREEN: CPT

## 2022-12-23 PROCEDURE — 99285 EMERGENCY DEPT VISIT HI MDM: CPT

## 2022-12-23 PROCEDURE — 83615 LACTATE (LD) (LDH) ENZYME: CPT

## 2022-12-23 PROCEDURE — 87186 SC STD MICRODIL/AGAR DIL: CPT

## 2022-12-23 PROCEDURE — 85730 THROMBOPLASTIN TIME PARTIAL: CPT

## 2022-12-23 PROCEDURE — 85610 PROTHROMBIN TIME: CPT

## 2022-12-23 PROCEDURE — 80076 HEPATIC FUNCTION PANEL: CPT

## 2022-12-23 PROCEDURE — 99239 HOSP IP/OBS DSCHRG MGMT >30: CPT

## 2022-12-23 PROCEDURE — 80162 ASSAY OF DIGOXIN TOTAL: CPT

## 2022-12-23 PROCEDURE — 86900 BLOOD TYPING SEROLOGIC ABO: CPT

## 2022-12-23 PROCEDURE — 81001 URINALYSIS AUTO W/SCOPE: CPT

## 2022-12-23 PROCEDURE — 83735 ASSAY OF MAGNESIUM: CPT

## 2022-12-23 PROCEDURE — 84484 ASSAY OF TROPONIN QUANT: CPT

## 2022-12-23 PROCEDURE — 97116 GAIT TRAINING THERAPY: CPT

## 2022-12-23 PROCEDURE — 82565 ASSAY OF CREATININE: CPT

## 2022-12-23 PROCEDURE — 36415 COLL VENOUS BLD VENIPUNCTURE: CPT

## 2022-12-23 RX ORDER — CEFPODOXIME PROXETIL 100 MG
200 TABLET ORAL EVERY 12 HOURS
Refills: 0 | Status: DISCONTINUED | OUTPATIENT
Start: 2022-12-23 | End: 2022-12-23

## 2022-12-23 RX ORDER — CEFPODOXIME PROXETIL 100 MG
1 TABLET ORAL
Qty: 0 | Refills: 0 | DISCHARGE
Start: 2022-12-23

## 2022-12-23 RX ORDER — CEFPODOXIME PROXETIL 100 MG
1 TABLET ORAL
Qty: 14 | Refills: 0
Start: 2022-12-23 | End: 2022-12-29

## 2022-12-23 RX ORDER — FUROSEMIDE 40 MG
40 TABLET ORAL DAILY
Refills: 0 | Status: DISCONTINUED | OUTPATIENT
Start: 2022-12-23 | End: 2022-12-23

## 2022-12-23 RX ORDER — WARFARIN SODIUM 2.5 MG/1
1 TABLET ORAL
Qty: 0 | Refills: 0 | DISCHARGE

## 2022-12-23 RX ADMIN — CARBIDOPA AND LEVODOPA 1 TABLET(S): 25; 100 TABLET ORAL at 05:31

## 2022-12-23 RX ADMIN — ESCITALOPRAM OXALATE 20 MILLIGRAM(S): 10 TABLET, FILM COATED ORAL at 13:30

## 2022-12-23 RX ADMIN — CLOPIDOGREL BISULFATE 75 MILLIGRAM(S): 75 TABLET, FILM COATED ORAL at 13:31

## 2022-12-23 RX ADMIN — BUPROPION HYDROCHLORIDE 150 MILLIGRAM(S): 150 TABLET, EXTENDED RELEASE ORAL at 13:30

## 2022-12-23 RX ADMIN — Medication 125 MICROGRAM(S): at 05:31

## 2022-12-23 RX ADMIN — Medication 200 MILLIGRAM(S): at 18:19

## 2022-12-23 RX ADMIN — CARBIDOPA AND LEVODOPA 1 TABLET(S): 25; 100 TABLET ORAL at 13:30

## 2022-12-23 RX ADMIN — Medication 25 MILLIGRAM(S): at 05:31

## 2022-12-23 NOTE — DISCHARGE NOTE PROVIDER - HOSPITAL COURSE
The patient is a 89 year old female with a past medical history of HFpEF, persistent afib on coumadin, bioMVR,  moderate to severe TR, tachy-neena syndrome s/p PPM, CAD s/p PCI to RCA, recurrent falls and syncope, parkinson's disease, dementia presents for altered mental status found to have UTI and COVID +. In the ER , CT head was negative. COVID positive with negative chest xray. Started on empiric IV Rocephin.  Urine culture positive for E Coli, switched to PO Vantin for 7 days. Seen by PT, recommend home with 24 hour supervision.    The patient is a 89 year old female with a past medical history of HFpEF, persistent afib on coumadin, bioMVR,  moderate to severe TR, tachy-neena syndrome s/p PPM, CAD s/p PCI to RCA, recurrent falls and syncope, parkinson's disease, dementia presents for altered mental status found to have UTI and COVID +. In the ER , CT head was negative. COVID positive with negative chest xray. Started on empiric IV Rocephin.  Urine culture positive for E Coli, switched to PO Vantin for 7 days. Seen by PT, recommend home with 24 hour supervision. Patient stable for discharge to home today.   The patient is a 89 year old female with a past medical history of HFpEF, persistent afib on coumadin, bioMVR,  moderate to severe TR, tachy-neena syndrome s/p PPM, CAD s/p PCI to RCA, recurrent falls and syncope, parkinson's disease, dementia presents for altered mental status found to have UTI and COVID +. In the ER , CT head was negative. COVID positive with negative chest xray. Started on empiric IV Rocephin.  Urine culture positive for E Coli, switched to PO Vantin for 7 days. Seen by PT, recommend home with 24 hour supervision. Patient stable for discharge to home today.    Plan of care discussed with patient's son in detail

## 2022-12-23 NOTE — DISCHARGE NOTE PROVIDER - NSDCMRMEDTOKEN_GEN_ALL_CORE_FT
atorvastatin 40 mg oral tablet: 1 tab(s) orally once a day  buPROPion 100 mg/12 hours (SR) oral tablet, extended release: 1 tab(s) orally once a day  carbidopa-levodopa 25 mg-100 mg oral tablet: 1 tab(s) orally 3 times a day  digoxin 125 mcg (0.125 mg) oral tablet: 1 tab(s) orally once a day  Lasix 40 mg oral tablet: 1 tab(s) orally once a day  Lexapro 20 mg oral tablet: 1 tab(s) orally once a day  metFORMIN 500 mg oral tablet, extended release: 1 tab(s) orally once a day  metoprolol succinate 25 mg oral tablet, extended release: 1 tab(s) orally once a day  Plavix 75 mg oral tablet: 1 tab(s) orally once a day  warfarin 3 mg oral tablet: 1 tab(s) orally once a day   atorvastatin 40 mg oral tablet: 1 tab(s) orally once a day  buPROPion 100 mg/12 hours (SR) oral tablet, extended release: 1 tab(s) orally once a day  carbidopa-levodopa 25 mg-100 mg oral tablet: 1 tab(s) orally 3 times a day  cefpodoxime 200 mg oral tablet: 1 tab(s) orally every 12 hours  digoxin 125 mcg (0.125 mg) oral tablet: 1 tab(s) orally once a day  Lasix 40 mg oral tablet: 1 tab(s) orally once a day  Lexapro 20 mg oral tablet: 1 tab(s) orally once a day  metFORMIN 500 mg oral tablet, extended release: 1 tab(s) orally once a day  metoprolol succinate 25 mg oral tablet, extended release: 1 tab(s) orally once a day  Plavix 75 mg oral tablet: 1 tab(s) orally once a day

## 2022-12-23 NOTE — PROGRESS NOTE ADULT - SUBJECTIVE AND OBJECTIVE BOX
[place] [facility], [facility]                                   [address] [place] [address]                                                      PHONE: [phone]                                                         FAX: [phone]  [email or web address]  ---------------------------------------------------------------------------------------------------------------------------------    Overnight events/patient complaints: No acute events. Pacemaker interrogation unremarkable.      No Known Allergies    MEDICATIONS  (STANDING):  atorvastatin 40 milliGRAM(s) Oral at bedtime  buPROPion XL (24-Hour) . 150 milliGRAM(s) Oral daily  carbidopa/levodopa  25/100 1 Tablet(s) Oral three times a day  cefTRIAXone Injectable. 1000 milliGRAM(s) IV Push every 24 hours  clopidogrel Tablet 75 milliGRAM(s) Oral daily  dextrose 5%. 1000 milliLiter(s) (50 mL/Hr) IV Continuous <Continuous>  dextrose 5%. 1000 milliLiter(s) (100 mL/Hr) IV Continuous <Continuous>  dextrose 50% Injectable 25 Gram(s) IV Push once  dextrose 50% Injectable 12.5 Gram(s) IV Push once  dextrose 50% Injectable 25 Gram(s) IV Push once  digoxin     Tablet 125 MICROGram(s) Oral daily  escitalopram 20 milliGRAM(s) Oral daily  glucagon  Injectable 1 milliGRAM(s) IntraMuscular once  insulin lispro (ADMELOG) corrective regimen sliding scale   SubCutaneous three times a day before meals  metoprolol succinate ER 25 milliGRAM(s) Oral daily  remdesivir  IVPB   IV Intermittent   remdesivir  IVPB 100 milliGRAM(s) IV Intermittent every 24 hours    MEDICATIONS  (PRN):  dextrose Oral Gel 15 Gram(s) Oral once PRN Blood Glucose LESS THAN 70 milliGRAM(s)/deciliter      Vital Signs Last 24 Hrs  T(C): 36.6 (22 Dec 2022 04:37), Max: 37.1 (21 Dec 2022 11:35)  T(F): 97.8 (22 Dec 2022 04:37), Max: 98.7 (21 Dec 2022 11:35)  HR: 83 (22 Dec 2022 04:37) (71 - 89)  BP: 154/71 (22 Dec 2022 04:37) (121/77 - 154/71)  BP(mean): --  RR: 18 (22 Dec 2022 04:37) (16 - 18)  SpO2: 94% (22 Dec 2022 04:37) (93% - 100%)    Parameters below as of 22 Dec 2022 00:27  Patient On (Oxygen Delivery Method): room air      ICU Vital Signs Last 24 Hrs  [facility]  I&O's Detail    Drug Dosing Weight  [employer]      PHYSICAL EXAM:  General: NAD  HEENT: Head; normocephalic, atraumatic.  Eyes: Conjunctiva normal  Neck: Supple  CARDIOVASCULAR: Irregularly irregular  LUNGS: No respiratory distress, normal inspiratory effort  ABDOMEN: Soft  EXTREMITIES: No edema b/l   SKIN: warm  NEURO: Alert        LABS:                        11.3   8.10  )-----------( 334      ( 22 Dec 2022 06:35 )             34.9     12    139  |  102  |  16.8  ----------------------------<  104<H>  3.9   |  29.0  |  0.88    Ca    9.1      22 Dec 2022 06:35  Mg     1.9     12-    TPro  6.6  /  Alb  3.7  /  TBili  0.6  /  DBili  0.1  /  AST  23  /  ALT  <5  /  AlkPhos  100  12    [facility]  CARDIAC MARKERS ( 21 Dec 2022 10:30 )  x     / <0.01 ng/mL / x     / x     / x          PT/INR - ( 22 Dec 2022 06:35 )   PT: 34.1 sec;   INR: 2.91 ratio         PTT - ( 22 Dec 2022 06:35 )  PTT:48.0 sec  Urinalysis Basic - ( 21 Dec 2022 12:00 )    Color: Yellow / Appearance: Slightly Turbid / S.015 / pH: x  Gluc: x / Ketone: Negative  / Bili: Negative / Urobili: Negative mg/dL   Blood: x / Protein: 15 / Nitrite: Negative   Leuk Esterase: Moderate / RBC: 3-5 /HPF / WBC 11-25 /HPF   Sq Epi: x / Non Sq Epi: Few / Bacteria: Moderate        RADIOLOGY & ADDITIONAL STUDIES:    INTERPRETATION OF TELEMETRY (personally reviewed): No significant events.    ASSESSMENT AND PLAN:  89 year old female with a PMHx of HFpEF, persistent afib on coumadin, bioMVR, moderate to severe TR, tachy-neena syndrome s/p PPM, CAD s/p PCI to RCA, recurrent falls and syncope, parkinson's disease, dementia who presents with dizziness and being off balance.    Dizziness  - echo pending  - monitor on tele. No acute events on monitor this AM  - Pacemaker interrogated without any events  - patient currently being treated for UTI/sinusitis. Abx as per primary team  - patient appears much more awake and alert today then yesterday when she came in    Afib  - continue to hold coumadin as patient is a high fall risk with recent fall two months ago with head strike  - will evaluate for watchman as an outpatient  - currently rate controlled  - c/w home toprol  - c/w home digoxin    CAD s/p PCI  - c/w home plavix  - c/w home atorvastatin        Thank you for letting [facility] to assist in the management of this patient. Please call with any questions.
CC: Follow up     INTERVAL HPI/OVERNIGHT EVENTS: Patient seen and examined, afebrile. Sitting up in a chair awake and alert      Vital Signs Last 24 Hrs  T(C): 36.8 (23 Dec 2022 04:53), Max: 36.9 (22 Dec 2022 21:25)  T(F): 98.2 (23 Dec 2022 04:53), Max: 98.4 (22 Dec 2022 21:25)  HR: 72 (23 Dec 2022 04:53) (68 - 74)  BP: 130/80 (23 Dec 2022 04:53) (118/58 - 135/84)  BP(mean): --  RR: 18 (23 Dec 2022 04:53) (18 - 20)  SpO2: 94% (23 Dec 2022 04:53) (94% - 97%)    Parameters below as of 23 Dec 2022 04:53  Patient On (Oxygen Delivery Method): room air        PHYSICAL EXAM:    GENERAL: NAD, AOX1  HEAD:  Atraumatic, Normocephalic  EYES:  conjunctiva and sclera clear  ENMT: Moist mucous membranes  NECK: Supple  CHEST/LUNG: Clear to auscultation bilaterally; No rales, rhonchi, wheezing, or rubs  HEART: Regular rate and rhythm; No murmurs, rubs, or gallops  ABDOMEN: Soft, Nontender, Nondistended; Bowel sounds present  EXTREMITIES:  2+ Peripheral Pulses, No clubbing, cyanosis, or edema        MEDICATIONS  (STANDING):  atorvastatin 40 milliGRAM(s) Oral at bedtime  buPROPion XL (24-Hour) . 150 milliGRAM(s) Oral daily  carbidopa/levodopa  25/100 1 Tablet(s) Oral three times a day  cefpodoxime 200 milliGRAM(s) Oral every 12 hours  clopidogrel Tablet 75 milliGRAM(s) Oral daily  dextrose 5%. 1000 milliLiter(s) (50 mL/Hr) IV Continuous <Continuous>  dextrose 5%. 1000 milliLiter(s) (100 mL/Hr) IV Continuous <Continuous>  dextrose 50% Injectable 25 Gram(s) IV Push once  dextrose 50% Injectable 12.5 Gram(s) IV Push once  dextrose 50% Injectable 25 Gram(s) IV Push once  digoxin     Tablet 125 MICROGram(s) Oral daily  escitalopram 20 milliGRAM(s) Oral daily  furosemide    Tablet 40 milliGRAM(s) Oral daily  glucagon  Injectable 1 milliGRAM(s) IntraMuscular once  insulin lispro (ADMELOG) corrective regimen sliding scale   SubCutaneous three times a day before meals  metoprolol succinate ER 25 milliGRAM(s) Oral daily  remdesivir  IVPB   IV Intermittent   remdesivir  IVPB 100 milliGRAM(s) IV Intermittent every 24 hours    MEDICATIONS  (PRN):  dextrose Oral Gel 15 Gram(s) Oral once PRN Blood Glucose LESS THAN 70 milliGRAM(s)/deciliter      Allergies    No Known Allergies    Intolerances          LABS:                          11.9   10.00 )-----------( 353      ( 23 Dec 2022 07:36 )             37.9     12-    140  |  102  |  15.4  ----------------------------<  110<H>  3.9   |  27.0  |  0.86    Ca    9.1      23 Dec 2022 07:36    TPro  6.8  /  Alb  3.7  /  TBili  0.6  /  DBili  0.2  /  AST  24  /  ALT  <5  /  AlkPhos  103  12-23    PT/INR - ( 23 Dec 2022 07:36 )   PT: 25.2 sec;   INR: 2.16 ratio         PTT - ( 22 Dec 2022 06:35 )  PTT:48.0 sec  Urinalysis Basic - ( 21 Dec 2022 12:00 )    Color: Yellow / Appearance: Slightly Turbid / S.015 / pH: x  Gluc: x / Ketone: Negative  / Bili: Negative / Urobili: Negative mg/dL   Blood: x / Protein: 15 / Nitrite: Negative   Leuk Esterase: Moderate / RBC: 3-5 /HPF / WBC 11-25 /HPF   Sq Epi: x / Non Sq Epi: Few / Bacteria: Moderate        RADIOLOGY & ADDITIONAL TESTS:  
CC: Follow up     INTERVAL HPI/OVERNIGHT EVENTS: Patient seen and examined, lethargic this morning. Son Tunde at bedside. Patient only speaks [place]. Patient's son states she is normally AOX2 but more lethargic for the past 1-2 days       Vital Signs Last 24 Hrs  T(C): 36.8 (22 Dec 2022 10:25), Max: 36.8 (22 Dec 2022 10:25)  T(F): 98.3 (22 Dec 2022 10:25), Max: 98.3 (22 Dec 2022 10:25)  HR: 74 (22 Dec 2022 10:25) (71 - 83)  BP: 151/74 (22 Dec 2022 10:25) (121/77 - 154/71)  BP(mean): --  RR: 18 (22 Dec 2022 10:25) (18 - 18)  SpO2: 95% (22 Dec 2022 10:25) (93% - 96%)    Parameters below as of 22 Dec 2022 10:25  Patient On (Oxygen Delivery Method): room air        PHYSICAL EXAM:    GENERAL: NAD, AOX1  HEAD:  Atraumatic, Normocephalic  EYES:  conjunctiva and sclera clear  ENMT: Moist mucous membranes  NECK: Supple  CHEST/LUNG: Clear to auscultation bilaterally; No rales, rhonchi, wheezing, or rubs  HEART: Regular rate and rhythm; No murmurs, rubs, or gallops  ABDOMEN: Soft, Nontender, Nondistended; Bowel sounds present  EXTREMITIES:  2+ Peripheral Pulses, No clubbing, cyanosis, or edema        MEDICATIONS  (STANDING):  atorvastatin 40 milliGRAM(s) Oral at bedtime  buPROPion XL (24-Hour) . 150 milliGRAM(s) Oral daily  carbidopa/levodopa  25/100 1 Tablet(s) Oral three times a day  cefTRIAXone Injectable. 1000 milliGRAM(s) IV Push every 24 hours  clopidogrel Tablet 75 milliGRAM(s) Oral daily  dextrose 5%. 1000 milliLiter(s) (50 mL/Hr) IV Continuous <Continuous>  dextrose 5%. 1000 milliLiter(s) (100 mL/Hr) IV Continuous <Continuous>  dextrose 50% Injectable 25 Gram(s) IV Push once  dextrose 50% Injectable 12.5 Gram(s) IV Push once  dextrose 50% Injectable 25 Gram(s) IV Push once  digoxin     Tablet 125 MICROGram(s) Oral daily  escitalopram 20 milliGRAM(s) Oral daily  glucagon  Injectable 1 milliGRAM(s) IntraMuscular once  insulin lispro (ADMELOG) corrective regimen sliding scale   SubCutaneous three times a day before meals  metoprolol succinate ER 25 milliGRAM(s) Oral daily  remdesivir  IVPB   IV Intermittent   remdesivir  IVPB 100 milliGRAM(s) IV Intermittent every 24 hours    MEDICATIONS  (PRN):  dextrose Oral Gel 15 Gram(s) Oral once PRN Blood Glucose LESS THAN 70 milliGRAM(s)/deciliter      Allergies    No Known Allergies    Intolerances          LABS:                          11.3   8.10  )-----------( 334      ( 22 Dec 2022 06:35 )             34.9         139  |  102  |  16.8  ----------------------------<  104<H>  3.9   |  29.0  |  0.88    Ca    9.1      22 Dec 2022 06:35  Mg     1.9         TPro  6.6  /  Alb  3.7  /  TBili  0.6  /  DBili  0.1  /  AST  23  /  ALT  <5  /  AlkPhos  100      PT/INR - ( 22 Dec 2022 06:35 )   PT: 34.1 sec;   INR: 2.91 ratio         PTT - ( 22 Dec 2022 06:35 )  PTT:48.0 sec  Urinalysis Basic - ( 21 Dec 2022 12:00 )    Color: Yellow / Appearance: Slightly Turbid / S.015 / pH: x  Gluc: x / Ketone: Negative  / Bili: Negative / Urobili: Negative mg/dL   Blood: x / Protein: 15 / Nitrite: Negative   Leuk Esterase: Moderate / RBC: 3-5 /HPF / WBC 11-25 /HPF   Sq Epi: x / Non Sq Epi: Few / Bacteria: Moderate        RADIOLOGY & ADDITIONAL TESTS:

## 2022-12-23 NOTE — DISCHARGE NOTE PROVIDER - NSDCCPCAREPLAN_GEN_ALL_CORE_FT
PRINCIPAL DISCHARGE DIAGNOSIS  Diagnosis: Acute metabolic encephalopathy  Assessment and Plan of Treatment: secondary to UTI  - CT head negative  - Positive UA      SECONDARY DISCHARGE DIAGNOSES  Diagnosis: Atrial fibrillation  Assessment and Plan of Treatment: On home coumadin- Supra- therapeutic INR  - Seen by Cardiology, after discussion  deemed high risk for AC given recurrent falls and bleeding risk  - To continue digoxin ( level WNL) and Metoprolol PO  - To be evaluated as outpatient for Watchman's procedure  - Follow up with cardiologist in 1-2 weeks       Diagnosis: E-coli UTI  Assessment and Plan of Treatment: - Urine culture positive for E Coli   - PO Vantin x 7 days  - follow up with your PMD in 1 week    Diagnosis: 2019 novel coronavirus disease (COVID-19)  Assessment and Plan of Treatment:

## 2022-12-23 NOTE — DISCHARGE NOTE PROVIDER - ATTENDING DISCHARGE PHYSICAL EXAMINATION:
Vital Signs Last 24 Hrs  T(C): 36.8 (23 Dec 2022 04:53), Max: 36.9 (22 Dec 2022 21:25)  T(F): 98.2 (23 Dec 2022 04:53), Max: 98.4 (22 Dec 2022 21:25)  HR: 72 (23 Dec 2022 04:53) (68 - 74)  BP: 130/80 (23 Dec 2022 04:53) (118/58 - 135/84)  BP(mean): --  RR: 18 (23 Dec 2022 04:53) (18 - 20)  SpO2: 94% (23 Dec 2022 04:53) (94% - 97%)    Parameters below as of 23 Dec 2022 04:53  Patient On (Oxygen Delivery Method): room air        PHYSICAL EXAM:    GENERAL: NAD, AOX1  HEAD:  Atraumatic, Normocephalic  EYES:  conjunctiva and sclera clear  ENMT: Moist mucous membranes  NECK: Supple  CHEST/LUNG: Clear to auscultation bilaterally; No rales, rhonchi, wheezing, or rubs  HEART: Regular rate and rhythm; No murmurs, rubs, or gallops  ABDOMEN: Soft, Nontender, Nondistended; Bowel sounds present  EXTREMITIES:  2+ Peripheral Pulses, No clubbing, cyanosis, or edema

## 2022-12-23 NOTE — PROGRESS NOTE ADULT - ASSESSMENT
The patient is a 89 year old female with a past medical history of HFpEF, persistent afib on coumadin, bioMVR,  moderate to severe TR, tachy-neena syndrome s/p PPM, CAD s/p PCI to RCA, recurrent falls and syncope, parkinson's disease, dementia presents for altered mental status found to have UTI and COVID +. In the ER , CT head was negative. COVID positive with negative chest xray. Started on empiric IV Rocephin.  Urine culture positive for E Coli, switched to PO Vantin for 7 days. Seen by PT, recommend home with 24 hour supervision.     Assessment/Plan:    1. Acute metabolic encephalopathy secondary to UTI  - CT head negative  - Positive UA- On Empiric IV Rocephin   - Urine culture prelim positive for E Coli     2. COVID 19  - Not hypoxic  - Started on IV Remdesivir day 3- Due to high risk of progression with underlying comorbidities    3. Chronic Afib  - On home coumadin- Supra- therapeutic INR  - Seen by Cardiology, after discussion with the family deemed high risk for AC given recurrent falls and bleeding risk  - To continue digoxin ( level WNL) and Metoprolol PO  - To be evaluated as outpatient for Watchman's procedure    4. Chronic HFpEF  - Lasix held for hypovolemia  - On metoprolol  - TTE reviewed    5.  History of tachy- neena syndrome  - Pacemaker in place  - Interrogation by Cardiology    6. Parkinson's disease  - Sinemet    7. History of CAD  - BB, Plavix, Statin     8. Diabetes Mellitus Type 2  - Admelog sliding scale  - Monitor BSl    VTE- SCDS; supra therapeutic INR     Discharge disposition: PT following     Discharge disposition:  Home with aids and family assist pending final urine cultures  The patient is a 89 year old female with a past medical history of HFpEF, persistent afib on coumadin, bioMVR,  moderate to severe TR, tachy-neena syndrome s/p PPM, CAD s/p PCI to RCA, recurrent falls and syncope, parkinson's disease, dementia presents for altered mental status found to have UTI and COVID +. In the ER , CT head was negative. COVID positive with negative chest xray. Started on empiric IV Rocephin.  Urine culture positive for E Coli, switched to PO Vantin for 7 days. Seen by PT, recommend home with 24 hour supervision.     Assessment/Plan:    1. Acute metabolic encephalopathy secondary to UTI  - CT head negative  - Positive UA  - Urine culture prelim positive for E Coli Change to PO vantin x 7 days     2. COVID 19  - Not hypoxic  - Started on IV Remdesivir day 3- Due to high risk of progression with underlying comorbidities    3. Chronic Afib  - On home coumadin- Supra- therapeutic INR  - Seen by Cardiology, after discussion with the family deemed high risk for AC given recurrent falls and bleeding risk  - To continue digoxin ( level WNL) and Metoprolol PO  - To be evaluated as outpatient for Watchman's procedure    4. Chronic HFpEF  - Lasix held for hypovolemia  - On metoprolol  - TTE reviewed    5.  History of tachy- neena syndrome  - Pacemaker in place  - Interrogation by Cardiology    6. Parkinson's disease  - Sinemet    7. History of CAD  - BB, Plavix, Statin     8. Diabetes Mellitus Type 2  - Admelog sliding scale  - Monitor BSl    VTE- SCDS; supra therapeutic INR     Discharge disposition: PT following     Discharge disposition:  Home with aids and family assist

## 2023-01-08 ENCOUNTER — EMERGENCY (EMERGENCY)
Facility: HOSPITAL | Age: 88
LOS: 1 days | Discharge: HOME | End: 2023-01-08
Attending: EMERGENCY MEDICINE
Payer: MEDICARE

## 2023-01-08 VITALS
HEART RATE: 91 BPM | OXYGEN SATURATION: 96 % | WEIGHT: 139.99 LBS | SYSTOLIC BLOOD PRESSURE: 150 MMHG | RESPIRATION RATE: 17 BRPM | HEIGHT: 62 IN | TEMPERATURE: 99 F | DIASTOLIC BLOOD PRESSURE: 63 MMHG

## 2023-01-08 VITALS
DIASTOLIC BLOOD PRESSURE: 66 MMHG | SYSTOLIC BLOOD PRESSURE: 152 MMHG | HEART RATE: 80 BPM | OXYGEN SATURATION: 98 % | TEMPERATURE: 98 F | RESPIRATION RATE: 17 BRPM

## 2023-01-08 DIAGNOSIS — Z98.890 OTHER SPECIFIED POSTPROCEDURAL STATES: Chronic | ICD-10-CM

## 2023-01-08 DIAGNOSIS — Z90.49 ACQUIRED ABSENCE OF OTHER SPECIFIED PARTS OF DIGESTIVE TRACT: Chronic | ICD-10-CM

## 2023-01-08 PROBLEM — I50.30 UNSPECIFIED DIASTOLIC (CONGESTIVE) HEART FAILURE: Chronic | Status: ACTIVE | Noted: 2022-12-21

## 2023-01-08 PROBLEM — I49.5 SICK SINUS SYNDROME: Chronic | Status: ACTIVE | Noted: 2022-12-21

## 2023-01-08 PROBLEM — G20 PARKINSON'S DISEASE: Chronic | Status: ACTIVE | Noted: 2022-12-21

## 2023-01-08 PROBLEM — I25.10 ATHEROSCLEROTIC HEART DISEASE OF NATIVE CORONARY ARTERY WITHOUT ANGINA PECTORIS: Chronic | Status: ACTIVE | Noted: 2022-12-21

## 2023-01-08 PROBLEM — I48.20 CHRONIC ATRIAL FIBRILLATION, UNSPECIFIED: Chronic | Status: ACTIVE | Noted: 2022-12-21

## 2023-01-08 PROCEDURE — 72125 CT NECK SPINE W/O DYE: CPT | Mod: 26,MA

## 2023-01-08 PROCEDURE — 12002 RPR S/N/AX/GEN/TRNK2.6-7.5CM: CPT

## 2023-01-08 PROCEDURE — 72170 X-RAY EXAM OF PELVIS: CPT

## 2023-01-08 PROCEDURE — 70450 CT HEAD/BRAIN W/O DYE: CPT | Mod: 26,MA

## 2023-01-08 PROCEDURE — 71045 X-RAY EXAM CHEST 1 VIEW: CPT | Mod: 26

## 2023-01-08 PROCEDURE — 99284 EMERGENCY DEPT VISIT MOD MDM: CPT | Mod: 25

## 2023-01-08 PROCEDURE — 71045 X-RAY EXAM CHEST 1 VIEW: CPT

## 2023-01-08 PROCEDURE — 99285 EMERGENCY DEPT VISIT HI MDM: CPT | Mod: 25

## 2023-01-08 PROCEDURE — 72125 CT NECK SPINE W/O DYE: CPT | Mod: MA

## 2023-01-08 PROCEDURE — 70450 CT HEAD/BRAIN W/O DYE: CPT | Mod: MA

## 2023-01-08 PROCEDURE — 93010 ELECTROCARDIOGRAM REPORT: CPT

## 2023-01-08 PROCEDURE — 93005 ELECTROCARDIOGRAM TRACING: CPT

## 2023-01-08 PROCEDURE — 72170 X-RAY EXAM OF PELVIS: CPT | Mod: 26

## 2023-01-08 RX ORDER — ACETAMINOPHEN 500 MG
650 TABLET ORAL ONCE
Refills: 0 | Status: COMPLETED | OUTPATIENT
Start: 2023-01-08 | End: 2023-01-08

## 2023-01-08 RX ADMIN — Medication 650 MILLIGRAM(S): at 10:49

## 2023-01-08 NOTE — ED PROVIDER NOTE - NS ED MD DISPO DISCHARGE
Initiate Treatment: Washing with non irritant cleansers\\nOil free moisturizing creams daily\\nBarrier ointments such as aquaphor or Vaseline on area Detail Level: Simple Discontinue Regimen: DC leg crossing Home

## 2023-01-08 NOTE — ED PROVIDER NOTE - PATIENT PORTAL LINK FT
You can access the FollowMyHealth Patient Portal offered by [facility] by registering at the following website: [email or web address]. By joining [facility]’s FollowMyHealth portal, you will also be able to view your health information using other applications (apps) compatible with our system.

## 2023-01-08 NOTE — ED PROVIDER NOTE - NSFOLLOWUPINSTRUCTIONS_ED_ALL_ED_FT
Closed Head Injury    A closed head injury is an injury to your head that may or may not involve a traumatic brain injury (TBI). Symptoms of TBI can be short or long lasting and include headache, dizziness, interference with memory or speech, fatigue, confusion, changes in sleep, mood changes, nausea, depression/anxiety, and dulling of senses. Make sure to obtain proper rest which includes getting plenty of sleep, avoiding excessive visual stimulation, and avoiding activities that may cause physical or mental stress. Avoid any situation where there is potential for another head injury, including sports.    SEEK IMMEDIATE MEDICAL CARE IF YOU HAVE ANY OF THE FOLLOWING SYMPTOMS: unusual drowsiness, vomiting, severe dizziness, seizures, lightheadedness, muscular weakness, different pupil sizes, visual changes, or clear or bloody discharge from your ears or nose.        Staple Care    WHAT YOU NEED TO KNOW:    Staples are often used to close a wound. Your staples may be placed for 3 to 14 days, depending on the location of your wound.    DISCHARGE INSTRUCTIONS:    Care for your wound:   •Clean: ?You may be able to shower in 24 hours. Do not soak your wound under water.      ?Gently wash your wound with soap and warm water daily. Lightly pat it dry. Do not cover your wound unless your healthcare provider tells you to.       ?You may also need to clean your wound with a mixture of hydrogen peroxide and water. Ask how to do this.      ?Do not apply ointment or cream to the wound unless your healthcare provider tells you to.      •Elevate: ?Rest any arm or leg that has a wound on pillows above the level of your heart. Do this as often as possible for 2 days. This will help decrease swelling and pain, and help you heal faster.           •Minimize scarring: ?Avoid sunshine on your wound to reduce scarring.             Follow up with your healthcare provider as directed: You may need to return for a wound checkup 3 days after your staples are placed. Ask when you should return to get your staples removed.    Staple removal:   •A medical staple remover will be used to take out your staples. Your healthcare provider will slide the tool under each staple, squeeze the handle, and gently pull the staple out.      •Medical tape will be placed on your wound once your staples are removed. This will help keep your wound closed. The medical tape will fall off on its own after several days.      Contact your healthcare provider if:   •You have redness, pain, swelling, or pus draining from your wound.      •Your pain medicine does not relieve your pain.      •You have a fever of 101°F (38.5°C) or higher.      •You have an odor coming from your wound.      •You have questions or concerns about your condition or care.      Seek care immediately if:   •Your wound reopens.      •You have red streaks on your skin that spread out from your wound.      •You have severe pain or vomiting. Please see you doctor within 5-7 days.    Closed Head Injury    A closed head injury is an injury to your head that may or may not involve a traumatic brain injury (TBI). Symptoms of TBI can be short or long lasting and include headache, dizziness, interference with memory or speech, fatigue, confusion, changes in sleep, mood changes, nausea, depression/anxiety, and dulling of senses. Make sure to obtain proper rest which includes getting plenty of sleep, avoiding excessive visual stimulation, and avoiding activities that may cause physical or mental stress. Avoid any situation where there is potential for another head injury, including sports.    SEEK IMMEDIATE MEDICAL CARE IF YOU HAVE ANY OF THE FOLLOWING SYMPTOMS: unusual drowsiness, vomiting, severe dizziness, seizures, lightheadedness, muscular weakness, different pupil sizes, visual changes, or clear or bloody discharge from your ears or nose.        Staple Care    WHAT YOU NEED TO KNOW:    Staples are often used to close a wound. Your staples may be placed for 3 to 14 days, depending on the location of your wound.    DISCHARGE INSTRUCTIONS:    Care for your wound:   •Clean: ?You may be able to shower in 24 hours. Do not soak your wound under water.      ?Gently wash your wound with soap and warm water daily. Lightly pat it dry. Do not cover your wound unless your healthcare provider tells you to.       ?You may also need to clean your wound with a mixture of hydrogen peroxide and water. Ask how to do this.      ?Do not apply ointment or cream to the wound unless your healthcare provider tells you to.      •Elevate: ?Rest any arm or leg that has a wound on pillows above the level of your heart. Do this as often as possible for 2 days. This will help decrease swelling and pain, and help you heal faster.           •Minimize scarring: ?Avoid sunshine on your wound to reduce scarring.             Follow up with your healthcare provider as directed: You may need to return for a wound checkup 3 days after your staples are placed. Ask when you should return to get your staples removed.    Staple removal:   •A medical staple remover will be used to take out your staples. Your healthcare provider will slide the tool under each staple, squeeze the handle, and gently pull the staple out.      •Medical tape will be placed on your wound once your staples are removed. This will help keep your wound closed. The medical tape will fall off on its own after several days.      Contact your healthcare provider if:   •You have redness, pain, swelling, or pus draining from your wound.      •Your pain medicine does not relieve your pain.      •You have a fever of 101°F (38.5°C) or higher.      •You have an odor coming from your wound.      •You have questions or concerns about your condition or care.      Seek care immediately if:   •Your wound reopens.      •You have red streaks on your skin that spread out from your wound.      •You have severe pain or vomiting.

## 2023-01-08 NOTE — ED PROVIDER NOTE - CLINICAL SUMMARY MEDICAL DECISION MAKING FREE TEXT BOX
A 89 yr old F w/a PMH of CHF, HTN, Parkinsons disease w/dementia, afib (previously on warfarin but now discontinued for 1.5 weeks due to UTI), TAVR, MVR, presents with her son c/o head injury after fall at home bedside. She has a aid daily from 9-5. As per son at bedside, Pt slipped 2 times at 3 am and around 6 am last night. Reports Pt had bleeding from R lateral head which already stopped on ED arrival. Denies loc, seizure, syncope, HA, CP, abd pain n/v/d or neck/back/pelvic pain. Denies motor or neurological deficit. Pt has the same mental status as usual per her son.  Check CT head cervical, ecg, cxr, pelvis. Suture is required for laceration of head.

## 2023-01-08 NOTE — ED ADULT NURSE NOTE - MUSCULOSKELETAL ASSESSMENT
- - - Secondary Intention Text (Leave Blank If You Do Not Want): The defect will heal with secondary intention.

## 2023-01-08 NOTE — ED PROVIDER NOTE - ATTENDING CONTRIBUTION TO CARE
Dr. Grijalva : I have personally seen and examined this patient at the bedside. I have fully participated in the care of this patient. I have reviewed all pertinent clinical information, including history, physical exam, plan and the Resident's note and agree except as noted.     89 yr old F w/a PMH of CHF, HTN, Parkinsons disease w/dementia, afib (previously on warfarin but now discontinued for 1.5 weeks due to UTI), TAVR, MVR, presents with her son c/o head injury after fall at home. She has a aid daily from 9-5. As per son at bedside, Pt slipped 2 times at 3 am and around 6 am last night, when trying to get up to go to the bathroom.  Reports Pt had bleeding from R lateral head. denies loc. Denies f/c/n/v/cp/sob/palpitations/cough/abd.pain/d/c/dysuria/hematuria. no sick contacts/recent travel.    PE:  head; atraumatic normocephalic  eyes: perrla  Heart: rrr s1s2  lungs: ctab  abd: soft, nt nd + bs no rebound/guarding no cva ttp  le: no swelling no calf ttp  back: no midline cervical/thoracic/lumbar ttp  neuro: aaox 2 does not know the date - at baseline, cn iii-xii intact strength 5/5 moving all extremeties      -->ct head/cspine negative; cxr and pelvic xray neg lac repaired able to walk--dc- mechanical fall

## 2023-01-08 NOTE — ED ADULT NURSE NOTE - OBJECTIVE STATEMENT
89yo F BIBEMS Chinese speaking.  Per EMS, pt sustained a mechanical fall while at home striking her head  with some bleeding.  per family and ems no LOC.  Pt has full range of motion x 4 + pulse motor and sensory.  Pt is confused at baseline per EMS and Family.

## 2023-01-08 NOTE — ED ADULT TRIAGE NOTE - CHIEF COMPLAINT QUOTE
Patient had fallen out of bed and had hit her head on the end table. Patient is confused, as per EMS & family, patient is normally A&OX1. Warfarin discontinued 1 week ago. Laceration present to the head.

## 2023-01-08 NOTE — ED PROVIDER NOTE - NS ED ATTENDING STATEMENT MOD
[No studies available for review at this time] : No studies available for review at this time
Attending with

## 2023-01-08 NOTE — ED PROVIDER NOTE - OBJECTIVE STATEMENT
A 89 yr old F w/a PMH of CHF, HTN, Parkinsons disease w/dementia, afib (previously on warfarin but now discontinued for 1.5 weeks due to UTI), TAVR, MVR, presents with her son c/o head injury after fall at home bedside. She has a aid daily from 9-5. As per son at bedside, Pt slipped 2 times at 3 am and around 6 am last night. Reports Pt had bleeding from R lateral head which already stopped on ED arrival. Denies loc, seizure, syncope, HA, CP, abd pain n/v/d or neck/back/pelvic pain. Denies motor or neurological deficit. Pt has the same mental status as usual per her son.

## 2023-03-08 ENCOUNTER — APPOINTMENT (OUTPATIENT)
Dept: ORTHOPEDIC SURGERY | Facility: CLINIC | Age: 88
End: 2023-03-08
Payer: MEDICARE

## 2023-03-08 VITALS
SYSTOLIC BLOOD PRESSURE: 145 MMHG | BODY MASS INDEX: 34 KG/M2 | HEIGHT: 58 IN | HEART RATE: 74 BPM | DIASTOLIC BLOOD PRESSURE: 67 MMHG | WEIGHT: 162 LBS

## 2023-03-08 DIAGNOSIS — M17.11 UNILATERAL PRIMARY OSTEOARTHRITIS, RIGHT KNEE: ICD-10-CM

## 2023-03-08 PROCEDURE — 73562 X-RAY EXAM OF KNEE 3: CPT | Mod: RT

## 2023-03-08 PROCEDURE — 99204 OFFICE O/P NEW MOD 45 MIN: CPT | Mod: 25

## 2023-03-08 PROCEDURE — 20610 DRAIN/INJ JOINT/BURSA W/O US: CPT | Mod: RT

## 2023-03-08 NOTE — PHYSICAL EXAM
[LE] : Sensory: Intact in bilateral lower extremities [ALL] : dorsalis pedis, posterior tibial, femoral, popliteal, and radial 2+ and symmetric bilaterally [Shuffling] : shuffling [Cane] : ambulates with cane [de-identified] : GENERAL APPEARANCE: Well nourished and hydrated, pleasant, alert, and oriented x 3. Appears their stated age. \par HEENT: Normocephalic, extraocular eye motion intact. Nasal septum midline. Oral cavity clear. External auditory canal clear. \par RESPIRATORY: Breath sounds clear and audible in all lobes. No wheezing, No accessory muscle use.\par CARDIOVASCULAR: No apparent abnormalities. No lower leg edema. No varicosities. Pedal pulses are palpable.\par NEUROLOGIC: Sensation is normal, no muscle weakness in the upper or lower extremities.\par DERMATOLOGIC: No apparent skin lesions, moist, warm, no rash.\par SPINE: Cervical spine appears normal and moves freely; thoracic spine appears normal and moves freely; lumbosacral spine appears normal and moves freely, normal, nontender.\par MUSCULOSKELETAL: Hands, wrists, and elbows are normal and move freely, shoulders are normal and move freely.  [de-identified] : Right knee exam shows mild joint effusion mild valgus alignment joint line tenderness range of motion is 0 to 110 degree she has antalgic gait with poor balance	  [de-identified] : 3V xray of the right knee done in the office today and reviewed by Dr. Jeffy An demonstrates advanced lateral compartmental osteoarthritis

## 2023-03-08 NOTE — PROCEDURE
[de-identified] : I injected the patient's right knee today with cortisone for primary osteoarthritis.\par \par I discussed at length with the patient the planned steroid and lidocaine injection. The risks, benefits, convalescence and alternatives were reviewed. The possible side effects discussed included but were not limited to: pain, swelling, heat, bleeding, and redness. Symptoms are generally mild but if they are extensive then contact the office. Giving pain relievers by mouth such as NSAIDs or Tylenol can generally treat the reactions to steroid and lidocaine. Rare cases of infection have been noted. Rash, hives and itching may occur post injection. If you have muscle pain or cramps, flushing and or swelling of the face, rapid heart beat, nausea, dizziness, fever, chills, headache, difficulty breathing, swelling in the arms or legs, or have a prickly feeling of your skin, contact a health care provider immediately. Following this discussion, the knee was prepped with Alcohol and under sterile condition the 80 mg Depo-Medrol and 6 cc Lidocaine injection was performed with a 20 gauge needle through a superolateral injection site. The needle was introduced into the joint, aspiration was performed to ensure intra-articular placement and the medication was injected. Upon withdrawal of the needle the site was cleaned with alcohol and a band aid applied. The patient tolerated the injection well and there were no adverse effects. Post injection instructions included no strenuous activity for 24 hours, cryotherapy and if there are any adverse effects to contact the office.

## 2023-03-08 NOTE — DISCUSSION/SUMMARY
[Medication Risks Reviewed] : Medication risks reviewed [Surgical risks reviewed] : Surgical risks reviewed [de-identified] : 89 y/o F pt is advanced lateral compartmental osteoarthritis of the right knee. The nature of her condition and treatment options were discussed in detail. The pt is a reasonable future candidate for a right TKA. However, we discussed that she has a higher risk of fall post surgery due to her hx of dementia and Parkinson. We recommend that the pt exhausts conservative treatment options such as cortisone injections, HA injections, and antiinflammatories. The pt agreed and opted for a right knee cortisone injection which she tolerated well. She will f/u in 3 months for a repeat injection if needed. All questions were answered. \par \par The patient is a 89 year individual with end stage arthritis of their right knee joint. Based upon the patient's continued symptoms and failure to respond to conservative treatment (including HA injections, cortisone injections, over the counter medications, and PT)I have recommended a right total knee arthroplasty for this patient. A long discussion took place with the patient describing what a total joint replacement is and what the procedure would entail. A total knee arthroplasty model, similar to the implant that was used during the operation, was utilized to demonstrate and to discuss the various bearing surfaces of the implants. The hospitalization and post-operative care and rehabilitation were also discussed. The use of perioperative antibiotics and DVT prophylaxis were discussed. The risk, benefits and alternatives to a surgical intervention were discussed at length with the patient. The patient was also advised of risks related to the medical comorbidities, elevated body mass index (BMI), and smoking where applicable. We discussed how to reduce modifiable risk factors and encouraged smoking cessation were applicable. A lengthy discussion took place to review the most common complications including but not limited to: deep vein thrombosis, pulmonary embolus, heart attack, stroke, infection, wound breakdown, numbness, damage to nerves, tendon, muscles, arteries or other blood vessels, death and other possible complications from anesthesia. The patient was told that we will take steps to minimize these risks by using sterile technique, antibiotics and DVT prophylaxis when appropriate and follow the patient postoperatively in the office setting to monitor progress. The possibility of recurrent pain, no improvement in pain and actual worsening of pain were also discussed with the patient.\par The discharge plan of care focused on the patient going home following surgery. The patient was encouraged to make the necessary arrangements to have someone stay with them when they are discharged home. Following discharge, a home care nurse was to the patient. The home care nurse would open the patient’s home care case and request home physical therapy services. Home physical therapy was to commence following discharge provided it was appropriate and covered by the health insurance benefit plan. \par The benefits of surgery were discussed with the patient including the potential for improving her current clinical condition through operative intervention. Alternatives to surgical intervention including continued conservative management were also discussed in detail. All questions were answered to the satisfaction of the patient. The treatment plan of care, as well as a model of a total knee arthroplasty equivalent to the one that will be used for their total joint replacement, was shared with the patient. The patient agreed to the plan of care as well as the use of implants in their total joint replacement.

## 2023-03-08 NOTE — HISTORY OF PRESENT ILLNESS
[Pain Location] : pain [3] : a current pain level of 3/10 [de-identified] : 89 y/o F pt is here for right knee pain.   patient is accompanied by his son who noticed that she complained of pain for in the past 2 years\par Her pain is 2 out of 10 it is intermittent she has no prior treatment other than seeing primary care physician who ordered an x-rays that were recommended to see an orthopedist she had x-rays of both knees done in December 29, 2021 shows lateral compartment degenerative arthropathy of the right knee patient has history of dementia and Parkinson she ambulates with a cane she has history of multiple falls in the past she takes Tylenol for pain.